# Patient Record
Sex: FEMALE | Race: WHITE | NOT HISPANIC OR LATINO | Employment: FULL TIME | URBAN - METROPOLITAN AREA
[De-identification: names, ages, dates, MRNs, and addresses within clinical notes are randomized per-mention and may not be internally consistent; named-entity substitution may affect disease eponyms.]

---

## 2020-09-14 ENCOUNTER — OFFICE VISIT (OUTPATIENT)
Dept: FAMILY MEDICINE CLINIC | Facility: CLINIC | Age: 65
End: 2020-09-14

## 2020-09-14 VITALS
HEART RATE: 80 BPM | BODY MASS INDEX: 29.23 KG/M2 | OXYGEN SATURATION: 97 % | DIASTOLIC BLOOD PRESSURE: 68 MMHG | TEMPERATURE: 97.1 F | SYSTOLIC BLOOD PRESSURE: 124 MMHG | WEIGHT: 165 LBS | HEIGHT: 63 IN

## 2020-09-14 DIAGNOSIS — G43.109 MIGRAINE WITH AURA AND WITHOUT STATUS MIGRAINOSUS, NOT INTRACTABLE: ICD-10-CM

## 2020-09-14 DIAGNOSIS — M77.8 LEFT WRIST TENDONITIS: Primary | ICD-10-CM

## 2020-09-14 DIAGNOSIS — B00.9 HERPES SIMPLEX INFECTION OF SKIN: ICD-10-CM

## 2020-09-14 PROCEDURE — 99204 OFFICE O/P NEW MOD 45 MIN: CPT | Performed by: FAMILY MEDICINE

## 2020-09-14 RX ORDER — ZOLMITRIPTAN 5 MG/1
5 TABLET, ORALLY DISINTEGRATING ORAL ONCE AS NEEDED
Qty: 9 TABLET | Refills: 0 | Status: SHIPPED | OUTPATIENT
Start: 2020-09-14

## 2020-09-14 RX ORDER — MELOXICAM 15 MG/1
15 TABLET ORAL DAILY
Qty: 30 TABLET | Refills: 0 | Status: SHIPPED | OUTPATIENT
Start: 2020-09-14 | End: 2020-11-18

## 2020-09-14 NOTE — PROGRESS NOTES
Subjective   Randi Rios is a 65 y.o. female.     Chief Complaint   Patient presents with   • Establish Care   • Wrist Pain     left       Patient is new to me today.  I had seen her in the past but the last visit was over 3 years ago.  She is here today to follow-up on migraine headaches.  She may get 3 migraines a year and takes Zomig 5 mg when she gets them.  It works well for her and she denies any side effects.    Patient is also here today with a new problem.  It started about 6 to 8 weeks ago.  She is having left forearm pain and it will radiate down into her thumb and first finger of her left hand.  Ibuprofen does help.  She has used a splint which also seems to help some.  The patient denies any swelling or redness in the area.  She also denies any trauma or injury.    Patient is also here today with another new problem.  She states that she gets a rash which is very itchy on the back of her right hand and on the left side of her low back intermittently.  She denies any pain.  It has been intermittently coming and going for the past several years.  It usually lasts about a week.  And it goes away completely in between outbreaks.       Review of Systems   Constitutional: Negative for activity change, chills, fatigue and fever.   HENT: Negative for hearing loss, swollen glands, tinnitus and trouble swallowing.    Eyes: Negative for pain and visual disturbance.   Respiratory: Negative for cough and shortness of breath.    Cardiovascular: Negative for chest pain, palpitations and leg swelling.   Gastrointestinal: Negative for diarrhea and nausea.   Endocrine: Negative for polydipsia and polyuria.   Genitourinary: Negative for difficulty urinating and urinary incontinence.   Musculoskeletal: Positive for arthralgias (L wrist pain). Negative for gait problem and joint swelling.   Skin: Positive for rash.   Allergic/Immunologic: Negative for immunocompromised state.   Neurological: Negative for dizziness,  "light-headedness and headache.   Hematological: Negative for adenopathy. Does not bruise/bleed easily.   Psychiatric/Behavioral: Negative for dysphoric mood and sleep disturbance.       The following portions of the patient's history were reviewed and updated as appropriate: allergies, current medications, past family history, past medical history, past social history, past surgical history and problem list.    Past Medical History:   Diagnosis Date   • Cancer (CMS/HCC)    • Migraine    • S/P breast biopsy, right 01/06/2016    BENIGN/ STEROTACTIC FOR CALCIFICATIONS       History reviewed. No pertinent surgical history.    Family History   Problem Relation Age of Onset   • Heart failure Mother    • Heart failure Father    • Cancer Sister    • No Known Problems Brother    • No Known Problems Son    • No Known Problems Daughter    • No Known Problems Maternal Grandmother    • No Known Problems Maternal Grandfather    • No Known Problems Paternal Grandmother    • No Known Problems Paternal Grandfather    • No Known Problems Cousin        Social History     Socioeconomic History   • Marital status:      Spouse name: Not on file   • Number of children: Not on file   • Years of education: Not on file   • Highest education level: Not on file   Tobacco Use   • Smoking status: Never Smoker   • Smokeless tobacco: Current User   Substance and Sexual Activity   • Alcohol use: Yes     Alcohol/week: 1.0 standard drinks     Types: 1 Glasses of wine per week   • Drug use: No         Current Outpatient Medications:   •  ZOLMitriptan (Zomig ZMT) 5 MG disintegrating tablet, Place 1 tablet on the tongue 1 (One) Time As Needed for Migraine for up to 1 dose., Disp: 9 tablet, Rfl: 0  •  meloxicam (MOBIC) 15 MG tablet, Take 1 tablet by mouth Daily., Disp: 30 tablet, Rfl: 0    Objective     Vitals:    09/14/20 1249   BP: 124/68   Pulse: 80   Temp: 97.1 °F (36.2 °C)   SpO2: 97%   Weight: 74.8 kg (165 lb)   Height: 158.8 cm (62.5\") "       Body mass index is 29.7 kg/m².    No components found for: 2D    Physical Exam  Vitals signs and nursing note reviewed.   Constitutional:       Appearance: She is well-developed.   HENT:      Head: Normocephalic and atraumatic.      Right Ear: Tympanic membrane, ear canal and external ear normal.      Left Ear: Tympanic membrane, ear canal and external ear normal.   Neck:      Musculoskeletal: Normal range of motion and neck supple.   Cardiovascular:      Rate and Rhythm: Normal rate and regular rhythm.      Heart sounds: Normal heart sounds.   Pulmonary:      Effort: Pulmonary effort is normal.      Breath sounds: Normal breath sounds.   Musculoskeletal: Normal range of motion.   Skin:     General: Skin is warm.      Findings: Rash (Healing papular rash located on the posterior right hand and on the left low back region.  Both areas are approximately 2 x 3 cm patches) present.   Neurological:      Mental Status: She is alert and oriented to person, place, and time.   Psychiatric:         Behavior: Behavior normal.         Thought Content: Thought content normal.         Judgment: Judgment normal.         Procedures    Assessment/Plan   Randi was seen today for establish care and wrist pain.    Diagnoses and all orders for this visit:    Left wrist tendonitis  -     meloxicam (MOBIC) 15 MG tablet; Take 1 tablet by mouth Daily.    Migraine with aura and without status migrainosus, not intractable  -     ZOLMitriptan (Zomig ZMT) 5 MG disintegrating tablet; Place 1 tablet on the tongue 1 (One) Time As Needed for Migraine for up to 1 dose.    Herpes simplex infection of skin    I advised the patient to return earlier in the process of the skin infections so that I could do a culture the next time.  However, I feel that it is likely herpes simplex infection of the skin.    I have also recommended the patient start doing daily ice applications of her left wrist and forearm.  She should also try to rest her left wrist  and wear a splint.    Patient Instructions   De Quervain's Tenosynovitis    De Quervain's tenosynovitis is a condition that causes inflammation of the tendon on the thumb side of the wrist. Tendons are cords of tissue that connect bones to muscles. The tendons in the hand pass through a tunnel called a sheath. A slippery layer of tissue (synovium) lets the tendons move smoothly in the sheath. With de Quervain's tenosynovitis, the sheath swells or thickens, causing friction and pain.  The condition is also called de Quervain's disease and de Quervain's syndrome. It occurs most often in women who are 30-50 years old.  What are the causes?  The exact cause of this condition is not known. It may be associated with overuse of the hand and wrist.  What increases the risk?  You are more likely to develop this condition if you:  · Use your hands far more than normal, especially if you repeat certain movements that involve twisting your hand or using a tight .  · Are pregnant.  · Are a middle-aged woman.  · Have rheumatoid arthritis.  · Have diabetes.  What are the signs or symptoms?  The main symptom of this condition is pain on the thumb side of the wrist. The pain may get worse when you grasp something or turn your wrist. Other symptoms may include:  · Pain that extends up the forearm.  · Swelling of your wrist and hand.  · Trouble moving the thumb and wrist.  · A sensation of snapping in the wrist.  · A bump filled with fluid (cyst) in the area of the pain.  How is this diagnosed?  This condition may be diagnosed based on:  · Your symptoms and medical history.  · A physical exam. During the exam, your health care provider may do a simple test (Finkelstein test) that involves pulling your thumb and wrist to see if this causes pain.  You may also need to have an X-ray.  How is this treated?  Treatment for this condition may include:  · Avoiding any activity that causes pain and swelling.  · Taking medicines.  Anti-inflammatory medicines and corticosteroid injections may be used to reduce inflammation and relieve pain.  · Wearing a splint.  · Having surgery. This may be needed if other treatments do not work.  Once the pain and swelling has gone down:  · Physical therapy. This includes stretching and strengthening exercises.  · Occupational therapy. This includes adjusting how you move your wrist.  Follow these instructions at home:  If you have a splint:  · Wear the splint as told by your health care provider. Remove it only as told by your health care provider.  · Loosen the splint if your fingers tingle, become numb, or turn cold and blue.  · Keep the splint clean.  · If the splint is not waterproof:  ? Do not let it get wet.  ? Cover it with a watertight covering when you take a bath or a shower.  Managing pain, stiffness, and swelling    · Avoid movements and activities that cause pain and swelling in the wrist area.  · If directed, put ice on the painful area. This may be helpful after doing activities that involve the sore wrist.  ? Put ice in a plastic bag.  ? Place a towel between your skin and the bag.  ? Leave the ice on for 20 minutes, 2-3 times a day.  · Move your fingers often to avoid stiffness and to lessen swelling.  · Raise (elevate) the injured area above the level of your heart while you are sitting or lying down.  General instructions  · Return to your normal activities as told by your health care provider. Ask your health care provider what activities are safe for you.  · Take over-the-counter and prescription medicines only as told by your health care provider.  · Keep all follow-up visits as told by your health care provider. This is important.  Contact a health care provider if:  · Your pain medicine does not help.  · Your pain gets worse.  · You develop new symptoms.  Summary  · De Quervain's tenosynovitis is a condition that causes inflammation of the tendon on the thumb side of the  wrist.  · The condition occurs most often in women who are 30-50 years old.  · The exact cause of this condition is not known. It may be associated with overuse of the hand and wrist.  · Treatment starts with avoiding activity that causes pain or swelling in the wrist area. Other treatment may include wearing a splint and taking medicine. Sometimes, surgery is needed.  This information is not intended to replace advice given to you by your health care provider. Make sure you discuss any questions you have with your health care provider.  Document Released: 09/12/2002 Document Revised: 06/20/2019 Document Reviewed: 11/26/2018  Elsevier Patient Education © 2020 [employer]

## 2020-09-14 NOTE — PATIENT INSTRUCTIONS
De Quervain's Tenosynovitis    De Quervain's tenosynovitis is a condition that causes inflammation of the tendon on the thumb side of the wrist. Tendons are cords of tissue that connect bones to muscles. The tendons in the hand pass through a tunnel called a sheath. A slippery layer of tissue (synovium) lets the tendons move smoothly in the sheath. With de Quervain's tenosynovitis, the sheath swells or thickens, causing friction and pain.  The condition is also called de Quervain's disease and de Quervain's syndrome. It occurs most often in women who are 30-50 years old.  What are the causes?  The exact cause of this condition is not known. It may be associated with overuse of the hand and wrist.  What increases the risk?  You are more likely to develop this condition if you:  · Use your hands far more than normal, especially if you repeat certain movements that involve twisting your hand or using a tight .  · Are pregnant.  · Are a middle-aged woman.  · Have rheumatoid arthritis.  · Have diabetes.  What are the signs or symptoms?  The main symptom of this condition is pain on the thumb side of the wrist. The pain may get worse when you grasp something or turn your wrist. Other symptoms may include:  · Pain that extends up the forearm.  · Swelling of your wrist and hand.  · Trouble moving the thumb and wrist.  · A sensation of snapping in the wrist.  · A bump filled with fluid (cyst) in the area of the pain.  How is this diagnosed?  This condition may be diagnosed based on:  · Your symptoms and medical history.  · A physical exam. During the exam, your health care provider may do a simple test (Finkelstein test) that involves pulling your thumb and wrist to see if this causes pain.  You may also need to have an X-ray.  How is this treated?  Treatment for this condition may include:  · Avoiding any activity that causes pain and swelling.  · Taking medicines. Anti-inflammatory medicines and corticosteroid  injections may be used to reduce inflammation and relieve pain.  · Wearing a splint.  · Having surgery. This may be needed if other treatments do not work.  Once the pain and swelling has gone down:  · Physical therapy. This includes stretching and strengthening exercises.  · Occupational therapy. This includes adjusting how you move your wrist.  Follow these instructions at home:  If you have a splint:  · Wear the splint as told by your health care provider. Remove it only as told by your health care provider.  · Loosen the splint if your fingers tingle, become numb, or turn cold and blue.  · Keep the splint clean.  · If the splint is not waterproof:  ? Do not let it get wet.  ? Cover it with a watertight covering when you take a bath or a shower.  Managing pain, stiffness, and swelling    · Avoid movements and activities that cause pain and swelling in the wrist area.  · If directed, put ice on the painful area. This may be helpful after doing activities that involve the sore wrist.  ? Put ice in a plastic bag.  ? Place a towel between your skin and the bag.  ? Leave the ice on for 20 minutes, 2-3 times a day.  · Move your fingers often to avoid stiffness and to lessen swelling.  · Raise (elevate) the injured area above the level of your heart while you are sitting or lying down.  General instructions  · Return to your normal activities as told by your health care provider. Ask your health care provider what activities are safe for you.  · Take over-the-counter and prescription medicines only as told by your health care provider.  · Keep all follow-up visits as told by your health care provider. This is important.  Contact a health care provider if:  · Your pain medicine does not help.  · Your pain gets worse.  · You develop new symptoms.  Summary  · De Quervain's tenosynovitis is a condition that causes inflammation of the tendon on the thumb side of the wrist.  · The condition occurs most often in women who are  30-50 years old.  · The exact cause of this condition is not known. It may be associated with overuse of the hand and wrist.  · Treatment starts with avoiding activity that causes pain or swelling in the wrist area. Other treatment may include wearing a splint and taking medicine. Sometimes, surgery is needed.  This information is not intended to replace advice given to you by your health care provider. Make sure you discuss any questions you have with your health care provider.  Document Released: 09/12/2002 Document Revised: 06/20/2019 Document Reviewed: 11/26/2018  Elsevier Patient Education © 2020 [employer]

## 2020-10-08 ENCOUNTER — OFFICE VISIT (OUTPATIENT)
Dept: FAMILY MEDICINE CLINIC | Facility: CLINIC | Age: 65
End: 2020-10-08

## 2020-10-08 VITALS
WEIGHT: 165 LBS | TEMPERATURE: 93.7 F | HEART RATE: 85 BPM | SYSTOLIC BLOOD PRESSURE: 126 MMHG | OXYGEN SATURATION: 98 % | BODY MASS INDEX: 29.23 KG/M2 | DIASTOLIC BLOOD PRESSURE: 72 MMHG | HEIGHT: 63 IN

## 2020-10-08 DIAGNOSIS — B00.89 HERPES DERMATITIS: Primary | ICD-10-CM

## 2020-10-08 PROCEDURE — 99214 OFFICE O/P EST MOD 30 MIN: CPT | Performed by: FAMILY MEDICINE

## 2020-10-08 RX ORDER — VALACYCLOVIR HYDROCHLORIDE 1 G/1
1000 TABLET, FILM COATED ORAL 3 TIMES DAILY
Qty: 21 TABLET | Refills: 0 | Status: SHIPPED | OUTPATIENT
Start: 2020-10-08

## 2020-10-08 NOTE — PROGRESS NOTES
Subjective   Randi Rios is a 65 y.o. female.     Chief Complaint   Patient presents with   • Rash     to right hand       Patient is here today with a new problem to the examiner.  It started this morning with itching and a rash on her right hand and on the left side of her lower back.  She states that she has had these symptoms in the past and it usually breaks out in a rash that is very itchy for a few days then goes away on it's own.  She has never used any medication on it except for topical lotions.  She has never had any cultures done of the lesions when the rash breaks out.       Review of Systems   Constitutional: Negative for activity change, chills, fatigue and fever.   HENT: Negative for hearing loss, swollen glands, tinnitus and trouble swallowing.    Eyes: Negative for pain and visual disturbance.   Respiratory: Negative for cough and shortness of breath.    Cardiovascular: Negative for chest pain, palpitations and leg swelling.   Gastrointestinal: Negative for diarrhea and nausea.   Endocrine: Negative for polydipsia and polyuria.   Genitourinary: Negative for difficulty urinating and urinary incontinence.   Musculoskeletal: Negative for arthralgias, gait problem and joint swelling.   Skin: Positive for rash.   Allergic/Immunologic: Negative for immunocompromised state.   Neurological: Negative for dizziness, light-headedness and headache.   Hematological: Negative for adenopathy. Does not bruise/bleed easily.   Psychiatric/Behavioral: Negative for dysphoric mood and sleep disturbance.       The following portions of the patient's history were reviewed and updated as appropriate: allergies, current medications, past family history, past medical history, past social history, past surgical history and problem list.    Past Medical History:   Diagnosis Date   • Cancer (CMS/HCC)    • Migraine    • S/P breast biopsy, right 01/06/2016    BENIGN/ STEROTACTIC FOR CALCIFICATIONS       History reviewed. No  "pertinent surgical history.    Family History   Problem Relation Age of Onset   • Heart failure Mother    • Heart failure Father    • Cancer Sister    • No Known Problems Brother    • No Known Problems Son    • No Known Problems Daughter    • No Known Problems Maternal Grandmother    • No Known Problems Maternal Grandfather    • No Known Problems Paternal Grandmother    • No Known Problems Paternal Grandfather    • No Known Problems Cousin        Social History     Socioeconomic History   • Marital status:      Spouse name: Not on file   • Number of children: Not on file   • Years of education: Not on file   • Highest education level: Not on file   Tobacco Use   • Smoking status: Never Smoker   • Smokeless tobacco: Current User   Substance and Sexual Activity   • Alcohol use: Yes     Alcohol/week: 1.0 standard drinks     Types: 1 Glasses of wine per week   • Drug use: No         Current Outpatient Medications:   •  meloxicam (MOBIC) 15 MG tablet, Take 1 tablet by mouth Daily., Disp: 30 tablet, Rfl: 0  •  ZOLMitriptan (Zomig ZMT) 5 MG disintegrating tablet, Place 1 tablet on the tongue 1 (One) Time As Needed for Migraine for up to 1 dose., Disp: 9 tablet, Rfl: 0  •  valACYclovir (Valtrex) 1000 MG tablet, Take 1 tablet by mouth 3 (Three) Times a Day., Disp: 21 tablet, Rfl: 0    Objective     Vitals:    10/08/20 1457   BP: 126/72   Pulse: 85   Temp: 93.7 °F (34.3 °C)   SpO2: 98%   Weight: 74.8 kg (165 lb)   Height: 158.8 cm (62.5\")       Body mass index is 29.7 kg/m².    No components found for: 2D    Physical Exam  Vitals signs and nursing note reviewed.   Constitutional:       Appearance: Normal appearance. She is well-developed.   HENT:      Head: Normocephalic and atraumatic.   Eyes:      General: No scleral icterus.     Conjunctiva/sclera: Conjunctivae normal.   Neck:      Musculoskeletal: Normal range of motion and neck supple.   Pulmonary:      Effort: Pulmonary effort is normal.   Lymphadenopathy:      " Cervical: No cervical adenopathy.   Skin:     General: Skin is warm and dry.      Findings: Rash present.      Comments: There is a erythematous papular patch located on the posterior right hand near the thumb and also an erythematous papular patch located on the left side of the lower back.  Both patches are about 3 x 5 cm in size   Neurological:      General: No focal deficit present.      Mental Status: She is alert and oriented to person, place, and time.   Psychiatric:         Mood and Affect: Mood normal.         Behavior: Behavior normal.         Thought Content: Thought content normal.         Judgment: Judgment normal.         Procedures    Assessment/Plan   Randi was seen today for rash.    Diagnoses and all orders for this visit:    Herpes dermatitis  -     Herpes Simplex Virus Culture - Swab, Axilla  -     Varicella Zoster Culture - Scrapings, Hand, Right  -     valACYclovir (Valtrex) 1000 MG tablet; Take 1 tablet by mouth 3 (Three) Times a Day.    The patient's rash is not vesicular at this time, it is papular.  That may affect the results of her cultures that I attempted to obtain today.  In any event, the rash does look like herpes simplex or zoster and should be effectively treated with Valtrex.  Patient was advised to return for any worsening symptoms.    There are no Patient Instructions on file for this visit.

## 2020-10-10 LAB — HSV SPEC CULT: NEGATIVE

## 2020-10-17 LAB — VZV SPEC QL CULT: NORMAL

## 2020-11-18 ENCOUNTER — OFFICE VISIT (OUTPATIENT)
Dept: FAMILY MEDICINE CLINIC | Facility: CLINIC | Age: 65
End: 2020-11-18

## 2020-11-18 VITALS
DIASTOLIC BLOOD PRESSURE: 86 MMHG | SYSTOLIC BLOOD PRESSURE: 132 MMHG | WEIGHT: 161.6 LBS | BODY MASS INDEX: 28.63 KG/M2 | HEART RATE: 78 BPM | HEIGHT: 63 IN | TEMPERATURE: 97.3 F | OXYGEN SATURATION: 98 %

## 2020-11-18 DIAGNOSIS — Z13.1 ENCOUNTER FOR SCREENING FOR DIABETES MELLITUS: ICD-10-CM

## 2020-11-18 DIAGNOSIS — M77.8 LEFT WRIST TENDONITIS: ICD-10-CM

## 2020-11-18 DIAGNOSIS — M25.532 LEFT WRIST PAIN: ICD-10-CM

## 2020-11-18 DIAGNOSIS — Z00.00 MEDICARE ANNUAL WELLNESS VISIT, SUBSEQUENT: Primary | ICD-10-CM

## 2020-11-18 DIAGNOSIS — Z11.59 NEED FOR HEPATITIS C SCREENING TEST: ICD-10-CM

## 2020-11-18 DIAGNOSIS — Z23 NEED FOR VACCINATION AGAINST STREPTOCOCCUS PNEUMONIAE USING PNEUMOCOCCAL CONJUGATE VACCINE 13: ICD-10-CM

## 2020-11-18 PROCEDURE — G0439 PPPS, SUBSEQ VISIT: HCPCS | Performed by: FAMILY MEDICINE

## 2020-11-18 PROCEDURE — 99213 OFFICE O/P EST LOW 20 MIN: CPT | Performed by: FAMILY MEDICINE

## 2020-11-18 PROCEDURE — 90670 PCV13 VACCINE IM: CPT | Performed by: FAMILY MEDICINE

## 2020-11-18 PROCEDURE — G0009 ADMIN PNEUMOCOCCAL VACCINE: HCPCS | Performed by: FAMILY MEDICINE

## 2020-11-18 NOTE — PROGRESS NOTES
The ABCs of the Annual Wellness Visit  Subsequent Medicare Wellness Visit    Chief Complaint   Patient presents with   • Medicare Wellness-subsequent     Patient is also here today to follow-up on left wrist tendinopathy.  I advised her to start meloxicam 15 mg daily, routinely using ice applications throughout the day and she has used a splint which also seems to help some.  However, all of these conservative treatments have not seemed to help overall with her pain.    Subjective   History of Present Illness:  Randi Rios is a 65 y.o. female who presents for a Subsequent Medicare Wellness Visit.    HEALTH RISK ASSESSMENT    Recent Hospitalizations:  No hospitalization(s) within the last year.    Current Medical Providers:  Patient Care Team:  Breana Sahni DO as PCP - General    Smoking Status:  Social History     Tobacco Use   Smoking Status Never Smoker   Smokeless Tobacco Current User       Alcohol Consumption:  Social History     Substance and Sexual Activity   Alcohol Use Yes   • Alcohol/week: 1.0 standard drinks   • Types: 1 Glasses of wine per week       Depression Screen:   PHQ-2/PHQ-9 Depression Screening 11/18/2020   Little interest or pleasure in doing things 0   Feeling down, depressed, or hopeless 0   Trouble falling or staying asleep, or sleeping too much 0   Feeling tired or having little energy 1   Poor appetite or overeating 0   Feeling bad about yourself - or that you are a failure or have let yourself or your family down 0   Trouble concentrating on things, such as reading the newspaper or watching television 0   Moving or speaking so slowly that other people could have noticed. Or the opposite - being so fidgety or restless that you have been moving around a lot more than usual 0   Thoughts that you would be better off dead, or of hurting yourself in some way 0   Total Score 1   If you checked off any problems, how difficult have these problems made it for you to do your work, take care  of things at home, or get along with other people? Not difficult at all       Fall Risk Screen:  SNOW Fall Risk Assessment was completed, and patient is at LOW risk for falls.Assessment completed on:11/18/2020    Health Habits and Functional and Cognitive Screening:  Functional & Cognitive Status 11/18/2020   Do you have difficulty preparing food and eating? No   Do you have difficulty bathing yourself, getting dressed or grooming yourself? No   Do you have difficulty using the toilet? No   Do you have difficulty moving around from place to place? No   Do you have trouble with steps or getting out of a bed or a chair? No   Current Diet Well Balanced Diet   Dental Exam Up to date   Eye Exam Up to date   Exercise (times per week) 5 times per week   Current Exercises Include Pilates   Do you need help using the phone?  No   Are you deaf or do you have serious difficulty hearing?  Yes   Do you need help with transportation? No   Do you need help shopping? No   Do you need help preparing meals?  No   Do you need help with housework?  No   Do you need help with laundry? No   Do you need help taking your medications? No   Do you need help managing money? No   Do you ever drive or ride in a car without wearing a seat belt? No   Have you felt unusual stress, anger or loneliness in the last month? Yes   Who do you live with? Spouse   If you need help, do you have trouble finding someone available to you? No   Have you been bothered in the last four weeks by sexual problems? No   Do you have difficulty concentrating, remembering or making decisions? No         Does the patient have evidence of cognitive impairment? No    Asprin use counseling:Does not need ASA (and currently is not on it)    Age-appropriate Screening Schedule:  Refer to the list below for future screening recommendations based on patient's age, sex and/or medical conditions. Orders for these recommended tests are listed in the plan section. The patient has  been provided with a written plan.    Health Maintenance   Topic Date Due   • ZOSTER VACCINE (1 of 2) 09/01/2004   • COLONOSCOPY  11/01/2021 (Originally 1/1/2020)   • MAMMOGRAM  05/01/2022   • TDAP/TD VACCINES (2 - Td) 05/14/2029   • INFLUENZA VACCINE  Completed          The following portions of the patient's history were reviewed and updated as appropriate: allergies, current medications, past family history, past medical history, past social history, past surgical history and problem list.    Outpatient Medications Prior to Visit   Medication Sig Dispense Refill   • ZOLMitriptan (Zomig ZMT) 5 MG disintegrating tablet Place 1 tablet on the tongue 1 (One) Time As Needed for Migraine for up to 1 dose. 9 tablet 0   • valACYclovir (Valtrex) 1000 MG tablet Take 1 tablet by mouth 3 (Three) Times a Day. 21 tablet 0   • meloxicam (MOBIC) 15 MG tablet Take 1 tablet by mouth Daily. 30 tablet 0     No facility-administered medications prior to visit.        There is no problem list on file for this patient.      Advanced Care Planning:  ACP discussion was held with the patient during this visit. Patient has an advance directive (not in EMR), copy requested.    Review of Systems   Constitutional: Negative for activity change, appetite change, fatigue and unexpected weight change.   HENT: Negative for congestion, ear pain, nosebleeds, sore throat and tinnitus.    Eyes: Negative for pain, redness and visual disturbance.   Respiratory: Negative for cough, shortness of breath and wheezing.    Cardiovascular: Negative for chest pain, palpitations and leg swelling.   Gastrointestinal: Negative for abdominal pain, blood in stool and nausea.   Endocrine: Negative for polydipsia and polyuria.   Genitourinary: Negative for dysuria, frequency, menstrual problem and vaginal discharge.   Musculoskeletal: Negative for arthralgias, joint swelling and myalgias.   Skin: Negative for rash.   Allergic/Immunologic: Negative for environmental  "allergies, food allergies and immunocompromised state.   Neurological: Negative for dizziness, speech difficulty, weakness and headaches.   Hematological: Negative for adenopathy. Does not bruise/bleed easily.   Psychiatric/Behavioral: Negative for decreased concentration and dysphoric mood. The patient is not nervous/anxious.        Compared to one year ago, the patient feels her physical health is the same.  Compared to one year ago, the patient feels her mental health is worse.    Reviewed chart for potential of high risk medication in the elderly: yes  Reviewed chart for potential of harmful drug interactions in the elderly:yes    Objective         Vitals:    11/18/20 1052   BP: 132/86   Pulse: 78   Temp: 97.3 °F (36.3 °C)   SpO2: 98%   Weight: 73.3 kg (161 lb 9.6 oz)   Height: 158.8 cm (62.5\")       Body mass index is 29.09 kg/m².  Discussed the patient's BMI with her. The BMI is above average; BMI management plan is completed.    Physical Exam  Vitals signs and nursing note reviewed.   Constitutional:       Appearance: She is well-developed.   HENT:      Head: Normocephalic and atraumatic.      Right Ear: External ear normal.      Left Ear: External ear normal.      Nose: Nose normal.   Eyes:      General: No scleral icterus.     Conjunctiva/sclera: Conjunctivae normal.   Neck:      Musculoskeletal: Normal range of motion and neck supple.   Cardiovascular:      Rate and Rhythm: Normal rate and regular rhythm.      Heart sounds: Normal heart sounds.   Pulmonary:      Effort: Pulmonary effort is normal.      Breath sounds: Normal breath sounds.   Musculoskeletal: Normal range of motion.         General: No swelling or deformity.      Right lower leg: No edema.      Left lower leg: No edema.      Comments: Normal and equal bilateral  strength.  Pain with range of motion of the left wrist.   Lymphadenopathy:      Cervical: No cervical adenopathy.   Skin:     General: Skin is warm and dry.      Findings: No " rash.   Neurological:      General: No focal deficit present.      Mental Status: She is alert and oriented to person, place, and time.   Psychiatric:         Mood and Affect: Mood normal.         Behavior: Behavior normal.         Thought Content: Thought content normal.         Judgment: Judgment normal.             Assessment/Plan   Medicare Risks and Personalized Health Plan  CMS Preventative Services Quick Reference  Advance Directive Discussion  Breast Cancer/Mammogram Screening  Cardiovascular risk  Colon Cancer Screening  Dementia/Memory   Depression/Dysphoria  Diabetic Lab Screening   Fall Risk  Glaucoma Risk  Immunizations Discussed/Encouraged (specific immunizations; Hepatitis A Vaccine/Series, Hepatitis B Vaccine/Series, Influenza, Pneumococcal 23, Prevnar and Shingrix )  Obesity/Overweight   Osteoprorosis Risk    The above risks/problems have been discussed with the patient.  Pertinent information has been shared with the patient in the After Visit Summary.  Follow up plans and orders are seen below in the Assessment/Plan Section.    Diagnoses and all orders for this visit:    1. Medicare annual wellness visit, subsequent (Primary)    2. Need for hepatitis C screening test  -     Hepatitis C Antibody    3. Need for vaccination against Streptococcus pneumoniae using pneumococcal conjugate vaccine 13  -     Pneumococcal Conjugate Vaccine 13-Val[facility] All    4. Encounter for screening for diabetes mellitus  -     Basic Metabolic Panel    5. Left wrist tendonitis  -     Ambulatory Referral to Orthopedic Surgery    6. Left wrist pain  -     Ambulatory Referral to Orthopedic Surgery      Follow Up:  No follow-ups on file.     An After Visit Summary and PPPS were given to the patient.

## 2020-11-19 LAB
BUN SERPL-MCNC: 20 MG/DL (ref 8–27)
BUN/CREAT SERPL: 27 (ref 12–28)
CALCIUM SERPL-MCNC: 9.4 MG/DL (ref 8.7–10.3)
CHLORIDE SERPL-SCNC: 103 MMOL/L (ref 96–106)
CO2 SERPL-SCNC: 26 MMOL/L (ref 20–29)
CREAT SERPL-MCNC: 0.74 MG/DL (ref 0.57–1)
GLUCOSE SERPL-MCNC: 97 MG/DL (ref 65–99)
HCV AB S/CO SERPL IA: <0.1 S/CO RATIO (ref 0–0.9)
POTASSIUM SERPL-SCNC: 4.4 MMOL/L (ref 3.5–5.2)
SODIUM SERPL-SCNC: 145 MMOL/L (ref 134–144)

## 2021-03-22 ENCOUNTER — BULK ORDERING (OUTPATIENT)
Dept: CASE MANAGEMENT | Facility: OTHER | Age: 66
End: 2021-03-22

## 2021-03-22 DIAGNOSIS — Z23 IMMUNIZATION DUE: ICD-10-CM

## 2021-06-11 ENCOUNTER — TELEPHONE (OUTPATIENT)
Dept: FAMILY MEDICINE CLINIC | Facility: CLINIC | Age: 66
End: 2021-06-11

## 2021-06-11 NOTE — TELEPHONE ENCOUNTER
Caller: Randi Rios    Relationship: Self    Best call back number:[phone]    What orders are you requesting (i.e. lab or imaging): DEXA SCAN     In what timeframe would the patient need to come in: ASAP     Where will you receive your lab/imaging services:     Additional notes: PATIENT CALLED IN AND WAS RECOMMENDED BY HER OBGYN TO HAVE A DEXA SCAN AND TO CONTACT HER PRIMARY DOCTOR TO ORDER IT. PLEASE CALL PATIENT AND ADVISE.

## 2021-11-22 ENCOUNTER — OFFICE VISIT (OUTPATIENT)
Dept: FAMILY MEDICINE CLINIC | Facility: CLINIC | Age: 66
End: 2021-11-22

## 2021-11-22 VITALS
HEART RATE: 71 BPM | OXYGEN SATURATION: 99 % | SYSTOLIC BLOOD PRESSURE: 136 MMHG | TEMPERATURE: 97.3 F | WEIGHT: 168.8 LBS | DIASTOLIC BLOOD PRESSURE: 72 MMHG | HEIGHT: 63 IN | BODY MASS INDEX: 29.91 KG/M2

## 2021-11-22 DIAGNOSIS — Z13.1 SCREENING FOR DIABETES MELLITUS: ICD-10-CM

## 2021-11-22 DIAGNOSIS — Z13.820 ENCOUNTER FOR SCREENING FOR OSTEOPOROSIS: ICD-10-CM

## 2021-11-22 DIAGNOSIS — Z78.0 MENOPAUSE: ICD-10-CM

## 2021-11-22 DIAGNOSIS — Z13.220 ENCOUNTER FOR LIPID SCREENING FOR CARDIOVASCULAR DISEASE: ICD-10-CM

## 2021-11-22 DIAGNOSIS — Z13.6 ENCOUNTER FOR LIPID SCREENING FOR CARDIOVASCULAR DISEASE: ICD-10-CM

## 2021-11-22 DIAGNOSIS — Z86.010 HISTORY OF COLONIC POLYPS: ICD-10-CM

## 2021-11-22 DIAGNOSIS — Z00.00 MEDICARE ANNUAL WELLNESS VISIT, SUBSEQUENT: Primary | ICD-10-CM

## 2021-11-22 DIAGNOSIS — Z12.11 SCREENING FOR MALIGNANT NEOPLASM OF COLON: ICD-10-CM

## 2021-11-22 PROCEDURE — 1159F MED LIST DOCD IN RCRD: CPT | Performed by: FAMILY MEDICINE

## 2021-11-22 PROCEDURE — 1170F FXNL STATUS ASSESSED: CPT | Performed by: FAMILY MEDICINE

## 2021-11-22 PROCEDURE — G0439 PPPS, SUBSEQ VISIT: HCPCS | Performed by: FAMILY MEDICINE

## 2021-11-22 NOTE — PROGRESS NOTES
The ABCs of the Annual Wellness Visit  Subsequent Medicare Wellness Visit    Chief Complaint   Patient presents with   • Medicare Wellness-subsequent      Subjective    History of Present Illness:  Randi Rios is a 66 y.o. female who presents for a Subsequent Medicare Wellness Visit.    The following portions of the patient's history were reviewed and   updated as appropriate: allergies, current medications, past family history, past social history, past surgical history and problem list.    Compared to one year ago, the patient feels her physical   health is the same.    Compared to one year ago, the patient feels her mental   health is the same.    Recent Hospitalizations:  She was not admitted to the hospital during the last year.       Current Medical Providers:  Patient Care Team:  Breana Sahni,  as PCP - General    Outpatient Medications Prior to Visit   Medication Sig Dispense Refill   • ZOLMitriptan (Zomig ZMT) 5 MG disintegrating tablet Place 1 tablet on the tongue 1 (One) Time As Needed for Migraine for up to 1 dose. 9 tablet 0   • valACYclovir (Valtrex) 1000 MG tablet Take 1 tablet by mouth 3 (Three) Times a Day. 21 tablet 0     No facility-administered medications prior to visit.       No opioid medication identified on active medication list. I have reviewed chart for other potential  high risk medication/s and harmful drug interactions in the elderly.          Aspirin is not on active medication list.  Aspirin use is not indicated based on review of current medical condition/s. Risk of harm outweighs potential benefits.  .    There is no problem list on file for this patient.    Advance Care Planning  Advance Directive is not on file.  ACP discussion was held with the patient during this visit. Patient has an advance directive (not in EMR), copy requested.          Objective    Vitals:    11/22/21 0912   BP: 136/72   Pulse: 71   Temp: 97.3 °F (36.3 °C)   SpO2: 99%   Weight: 76.6 kg (168 lb  "12.8 oz)   Height: 160 cm (63\")     Body mass index is 29.9 kg/m².  BMI has not been calculated during today's encounter.     Does the patient have evidence of cognitive impairment? No    Physical Exam  Vitals and nursing note reviewed.   Constitutional:       Appearance: Normal appearance. She is well-developed.   HENT:      Head: Normocephalic and atraumatic.   Eyes:      General: No scleral icterus.     Conjunctiva/sclera: Conjunctivae normal.   Cardiovascular:      Rate and Rhythm: Normal rate and regular rhythm.      Heart sounds: Normal heart sounds.   Pulmonary:      Effort: Pulmonary effort is normal.      Breath sounds: Normal breath sounds.   Abdominal:      General: Bowel sounds are normal.      Palpations: Abdomen is soft.   Musculoskeletal:         General: Normal range of motion.      Cervical back: Normal range of motion and neck supple.      Right lower leg: No edema.      Left lower leg: No edema.   Skin:     General: Skin is warm and dry.      Capillary Refill: Capillary refill takes less than 2 seconds.      Findings: No rash.   Neurological:      Mental Status: She is alert and oriented to person, place, and time.   Psychiatric:         Mood and Affect: Mood normal.         Behavior: Behavior normal.         Thought Content: Thought content normal.         Judgment: Judgment normal.                 HEALTH RISK ASSESSMENT    Smoking Status:  Social History     Tobacco Use   Smoking Status Never Smoker   Smokeless Tobacco Current User     Alcohol Consumption:  Social History     Substance and Sexual Activity   Alcohol Use Yes   • Alcohol/week: 1.0 standard drink   • Types: 1 Glasses of wine per week     Fall Risk Screen:    SNOW Fall Risk Assessment was completed, and patient is at LOW risk for falls.Assessment completed on:11/22/2021    Depression Screening:  PHQ-2/PHQ-9 Depression Screening 11/22/2021   Little interest or pleasure in doing things 0   Feeling down, depressed, or hopeless 0 "   Trouble falling or staying asleep, or sleeping too much -   Feeling tired or having little energy -   Poor appetite or overeating -   Feeling bad about yourself - or that you are a failure or have let yourself or your family down -   Trouble concentrating on things, such as reading the newspaper or watching television -   Moving or speaking so slowly that other people could have noticed. Or the opposite - being so fidgety or restless that you have been moving around a lot more than usual -   Thoughts that you would be better off dead, or of hurting yourself in some way -   Total Score 0   If you checked off any problems, how difficult have these problems made it for you to do your work, take care of things at home, or get along with other people? -       Health Habits and Functional and Cognitive Screening:  Functional & Cognitive Status 11/22/2021   Do you have difficulty preparing food and eating? No   Do you have difficulty bathing yourself, getting dressed or grooming yourself? No   Do you have difficulty using the toilet? No   Do you have difficulty moving around from place to place? No   Do you have trouble with steps or getting out of a bed or a chair? No   Current Diet Well Balanced Diet   Dental Exam Up to date   Eye Exam Up to date   Exercise (times per week) 2 times per week   Current Exercises Include Walking   Do you need help using the phone?  No   Are you deaf or do you have serious difficulty hearing?  Yes   Do you need help with transportation? No   Do you need help shopping? No   Do you need help preparing meals?  No   Do you need help with housework?  No   Do you need help with laundry? No   Do you need help taking your medications? No   Do you need help managing money? No   Do you ever drive or ride in a car without wearing a seat belt? No   Have you felt unusual stress, anger or loneliness in the last month? -   Who do you live with? -   If you need help, do you have trouble finding someone  available to you? -   Have you been bothered in the last four weeks by sexual problems? -   Do you have difficulty concentrating, remembering or making decisions? -       Age-appropriate Screening Schedule:  Refer to the list below for future screening recommendations based on patient's age, sex and/or medical conditions. Orders for these recommended tests are listed in the plan section. The patient has been provided with a written plan.    Health Maintenance   Topic Date Due   • DXA SCAN  Never done   • ZOSTER VACCINE (2 of 2) 12/31/2021   • MAMMOGRAM  05/03/2023   • TDAP/TD VACCINES (2 - Td or Tdap) 05/14/2029   • INFLUENZA VACCINE  Completed              Assessment/Plan   CMS Preventative Services Quick Reference  Risk Factors Identified During Encounter  Immunizations Discussed/Encouraged (specific Immunizations; Influenza, Pneumococcal 23, Shingrix and COVID19  Obesity/Overweight   The above risks/problems have been discussed with the patient.  Follow up actions/plans if indicated are seen below in the Assessment/Plan Section.  Pertinent information has been shared with the patient in the After Visit Summary.    Diagnoses and all orders for this visit:    1. Medicare annual wellness visit, subsequent (Primary)    2. Encounter for screening for osteoporosis  -     DEXA Bone Density Axial; Future    3. Menopause  -     DEXA Bone Density Axial; Future    4. History of colonic polyps  -     Ambulatory Referral to General Surgery    5. Screening for malignant neoplasm of colon  -     Ambulatory Referral to General Surgery    6. Encounter for lipid screening for cardiovascular disease  -     Lipid Panel    7. Screening for diabetes mellitus  -     Comprehensive Metabolic Panel        Follow Up:   Return in about 1 year (around 11/22/2022) for Medicare Wellness.     An After Visit Summary and PPPS were made available to the patient.                    staff

## 2021-11-23 LAB
ALBUMIN SERPL-MCNC: 4 G/DL (ref 3.8–4.8)
ALBUMIN/GLOB SERPL: 1.5 {RATIO} (ref 1.2–2.2)
ALP SERPL-CCNC: 68 IU/L (ref 44–121)
ALT SERPL-CCNC: 14 IU/L (ref 0–32)
AST SERPL-CCNC: 18 IU/L (ref 0–40)
BILIRUB SERPL-MCNC: 0.4 MG/DL (ref 0–1.2)
BUN SERPL-MCNC: 18 MG/DL (ref 8–27)
BUN/CREAT SERPL: 24 (ref 12–28)
CALCIUM SERPL-MCNC: 9.4 MG/DL (ref 8.7–10.3)
CHLORIDE SERPL-SCNC: 104 MMOL/L (ref 96–106)
CHOLEST SERPL-MCNC: 206 MG/DL (ref 100–199)
CO2 SERPL-SCNC: 26 MMOL/L (ref 20–29)
CREAT SERPL-MCNC: 0.74 MG/DL (ref 0.57–1)
GLOBULIN SER CALC-MCNC: 2.6 G/DL (ref 1.5–4.5)
GLUCOSE SERPL-MCNC: 93 MG/DL (ref 65–99)
HDLC SERPL-MCNC: 72 MG/DL
LDLC SERPL CALC-MCNC: 123 MG/DL (ref 0–99)
POTASSIUM SERPL-SCNC: 4.4 MMOL/L (ref 3.5–5.2)
PROT SERPL-MCNC: 6.6 G/DL (ref 6–8.5)
SODIUM SERPL-SCNC: 142 MMOL/L (ref 134–144)
TRIGL SERPL-MCNC: 59 MG/DL (ref 0–149)
VLDLC SERPL CALC-MCNC: 11 MG/DL (ref 5–40)

## 2021-12-08 ENCOUNTER — TELEPHONE (OUTPATIENT)
Dept: FAMILY MEDICINE CLINIC | Facility: CLINIC | Age: 66
End: 2021-12-08

## 2021-12-08 NOTE — TELEPHONE ENCOUNTER
Caller: Randi Rios    Relationship to patient: Self    Best call back number: [phone]    Patient is needing: PATIENT CANCELLED CONSULTATION APPT WITH SURGEON THAT DR MICHAELS RECOMMENDED. PATIENT IS ASKING IS DR MICHAELS HAS RECORD OF WHEN SHE HAD LAST COLONOSCOPY AND WHO THE DOCTOR WAS.     PLEASE ADVISE

## 2021-12-08 NOTE — TELEPHONE ENCOUNTER
Pt advised that we do not have record of a colonoscopy on file.  We do not have her records from the old office

## 2021-12-09 ENCOUNTER — TELEPHONE (OUTPATIENT)
Dept: FAMILY MEDICINE CLINIC | Facility: CLINIC | Age: 66
End: 2021-12-09

## 2021-12-09 DIAGNOSIS — Z86.010 HISTORY OF COLONIC POLYPS: Primary | ICD-10-CM

## 2021-12-09 DIAGNOSIS — Z12.11 SCREENING FOR MALIGNANT NEOPLASM OF COLON: ICD-10-CM

## 2021-12-09 NOTE — TELEPHONE ENCOUNTER
PATIENT IS REQUESTING A NEW COLONOSCOPY REFERRAL WITH A DIFFERENT PROVIDER. SHE SAID SHE DOES NOT WANT TO SEE DR CAESAR ALMANZAR.     CALL: [phone]

## 2021-12-09 NOTE — TELEPHONE ENCOUNTER
Will you please check with the patient to see where she would like to have her colonoscopy done?  That will determine whom I refer her to.  FRANCIA- Dr. Green is the only provider performing colonoscopies at [facility].

## 2021-12-27 ENCOUNTER — PRE-PROCEDURE SCREENING (OUTPATIENT)
Dept: GASTROENTEROLOGY | Facility: CLINIC | Age: 66
End: 2021-12-27

## 2021-12-27 NOTE — TELEPHONE ENCOUNTER
Colonoscopy screening has been sent to  for review           Pt verbalized and understood that it would be few weeks before been schedule             Records scan in media

## 2022-01-09 ENCOUNTER — PREP FOR SURGERY (OUTPATIENT)
Dept: OTHER | Facility: HOSPITAL | Age: 67
End: 2022-01-09

## 2022-01-09 DIAGNOSIS — K63.5 COLON POLYP: Primary | ICD-10-CM

## 2022-02-18 ENCOUNTER — TELEPHONE (OUTPATIENT)
Dept: GASTROENTEROLOGY | Facility: CLINIC | Age: 67
End: 2022-02-18

## 2022-04-05 ENCOUNTER — TELEPHONE (OUTPATIENT)
Dept: GASTROENTEROLOGY | Facility: CLINIC | Age: 67
End: 2022-04-05

## 2022-04-06 PROBLEM — K63.5 COLON POLYP: Status: ACTIVE | Noted: 2022-04-06

## 2022-04-15 ENCOUNTER — HOSPITAL ENCOUNTER (OUTPATIENT)
Dept: BONE DENSITY | Facility: HOSPITAL | Age: 67
Discharge: HOME | End: 2022-04-15
Admitting: FAMILY MEDICINE

## 2022-04-15 DIAGNOSIS — Z78.0 MENOPAUSE: ICD-10-CM

## 2022-04-15 DIAGNOSIS — Z13.820 ENCOUNTER FOR SCREENING FOR OSTEOPOROSIS: ICD-10-CM

## 2022-04-15 PROCEDURE — 77080 DXA BONE DENSITY AXIAL: CPT

## 2022-07-29 ENCOUNTER — HOSPITAL ENCOUNTER (OUTPATIENT)
Facility: HOSPITAL | Age: 67
Setting detail: HOSPITAL OUTPATIENT SURGERY
Discharge: HOME | End: 2022-07-29
Attending: INTERNAL MEDICINE | Admitting: INTERNAL MEDICINE

## 2022-07-29 ENCOUNTER — ANESTHESIA EVENT (OUTPATIENT)
Dept: GASTROENTEROLOGY | Facility: HOSPITAL | Age: 67
End: 2022-07-29

## 2022-07-29 ENCOUNTER — ANESTHESIA (OUTPATIENT)
Dept: GASTROENTEROLOGY | Facility: HOSPITAL | Age: 67
End: 2022-07-29

## 2022-07-29 VITALS
WEIGHT: 163.1 LBS | OXYGEN SATURATION: 98 % | HEART RATE: 80 BPM | DIASTOLIC BLOOD PRESSURE: 75 MMHG | SYSTOLIC BLOOD PRESSURE: 137 MMHG | RESPIRATION RATE: 16 BRPM | BODY MASS INDEX: 28.9 KG/M2 | HEIGHT: 63 IN

## 2022-07-29 DIAGNOSIS — K63.5 COLON POLYP: ICD-10-CM

## 2022-07-29 LAB
ALBUMIN SERPL-MCNC: 3.9 G/DL (ref 3.5–5.2)
ALBUMIN/GLOB SERPL: 1.3 G/DL
ALP SERPL-CCNC: 59 U/L (ref 39–117)
ALT SERPL W P-5'-P-CCNC: 17 U/L (ref 1–33)
ANION GAP SERPL CALCULATED.3IONS-SCNC: 11.4 MMOL/L (ref 5–15)
AST SERPL-CCNC: 20 U/L (ref 1–32)
BASOPHILS # BLD AUTO: 0.03 10*3/MM3 (ref 0–0.2)
BASOPHILS NFR BLD AUTO: 0.5 % (ref 0–1.5)
BILIRUB SERPL-MCNC: 0.4 MG/DL (ref 0–1.2)
BUN SERPL-MCNC: 11 MG/DL (ref 8–23)
BUN/CREAT SERPL: 15.9 (ref 7–25)
CALCIUM SPEC-SCNC: 9.4 MG/DL (ref 8.6–10.5)
CEA SERPL-MCNC: 1.08 NG/ML
CHLORIDE SERPL-SCNC: 108 MMOL/L (ref 98–107)
CO2 SERPL-SCNC: 24.6 MMOL/L (ref 22–29)
CREAT SERPL-MCNC: 0.69 MG/DL (ref 0.57–1)
DEPRECATED RDW RBC AUTO: 38.6 FL (ref 37–54)
EGFRCR SERPLBLD CKD-EPI 2021: 95.3 ML/MIN/1.73
EOSINOPHIL # BLD AUTO: 0.01 10*3/MM3 (ref 0–0.4)
EOSINOPHIL NFR BLD AUTO: 0.2 % (ref 0.3–6.2)
ERYTHROCYTE [DISTWIDTH] IN BLOOD BY AUTOMATED COUNT: 11.9 % (ref 12.3–15.4)
GLOBULIN UR ELPH-MCNC: 3 GM/DL
GLUCOSE SERPL-MCNC: 91 MG/DL (ref 65–99)
HCT VFR BLD AUTO: 40.6 % (ref 34–46.6)
HGB BLD-MCNC: 13.5 G/DL (ref 12–15.9)
IMM GRANULOCYTES # BLD AUTO: 0.02 10*3/MM3 (ref 0–0.05)
IMM GRANULOCYTES NFR BLD AUTO: 0.3 % (ref 0–0.5)
LYMPHOCYTES # BLD AUTO: 0.78 10*3/MM3 (ref 0.7–3.1)
LYMPHOCYTES NFR BLD AUTO: 12.3 % (ref 19.6–45.3)
MCH RBC QN AUTO: 29.8 PG (ref 26.6–33)
MCHC RBC AUTO-ENTMCNC: 33.3 G/DL (ref 31.5–35.7)
MCV RBC AUTO: 89.6 FL (ref 79–97)
MONOCYTES # BLD AUTO: 0.31 10*3/MM3 (ref 0.1–0.9)
MONOCYTES NFR BLD AUTO: 4.9 % (ref 5–12)
NEUTROPHILS NFR BLD AUTO: 5.2 10*3/MM3 (ref 1.7–7)
NEUTROPHILS NFR BLD AUTO: 81.8 % (ref 42.7–76)
NRBC BLD AUTO-RTO: 0 /100 WBC (ref 0–0.2)
PLATELET # BLD AUTO: 219 10*3/MM3 (ref 140–450)
PMV BLD AUTO: 10.4 FL (ref 6–12)
POTASSIUM SERPL-SCNC: 3.6 MMOL/L (ref 3.5–5.2)
PROT SERPL-MCNC: 6.9 G/DL (ref 6–8.5)
RBC # BLD AUTO: 4.53 10*6/MM3 (ref 3.77–5.28)
SODIUM SERPL-SCNC: 144 MMOL/L (ref 136–145)
WBC NRBC COR # BLD: 6.35 10*3/MM3 (ref 3.4–10.8)

## 2022-07-29 PROCEDURE — 25010000002 PROPOFOL 10 MG/ML EMULSION

## 2022-07-29 PROCEDURE — 85025 COMPLETE CBC W/AUTO DIFF WBC: CPT | Performed by: INTERNAL MEDICINE

## 2022-07-29 PROCEDURE — 80053 COMPREHEN METABOLIC PANEL: CPT | Performed by: INTERNAL MEDICINE

## 2022-07-29 PROCEDURE — 88305 TISSUE EXAM BY PATHOLOGIST: CPT | Performed by: INTERNAL MEDICINE

## 2022-07-29 PROCEDURE — 45385 COLONOSCOPY W/LESION REMOVAL: CPT | Performed by: INTERNAL MEDICINE

## 2022-07-29 PROCEDURE — 82378 CARCINOEMBRYONIC ANTIGEN: CPT | Performed by: INTERNAL MEDICINE

## 2022-07-29 RX ORDER — SODIUM CHLORIDE, SODIUM LACTATE, POTASSIUM CHLORIDE, CALCIUM CHLORIDE 600; 310; 30; 20 MG/100ML; MG/100ML; MG/100ML; MG/100ML
1000 INJECTION, SOLUTION INTRAVENOUS CONTINUOUS
Status: DISCONTINUED | OUTPATIENT
Start: 2022-07-29 | End: 2022-07-29 | Stop reason: HOSPADM

## 2022-07-29 RX ORDER — PROPOFOL 10 MG/ML
VIAL (ML) INTRAVENOUS AS NEEDED
Status: DISCONTINUED | OUTPATIENT
Start: 2022-07-29 | End: 2022-07-29 | Stop reason: SURG

## 2022-07-29 RX ORDER — LIDOCAINE HYDROCHLORIDE 10 MG/ML
0.5 INJECTION, SOLUTION INFILTRATION; PERINEURAL ONCE AS NEEDED
Status: DISCONTINUED | OUTPATIENT
Start: 2022-07-29 | End: 2022-07-29 | Stop reason: HOSPADM

## 2022-07-29 RX ORDER — PROPOFOL 10 MG/ML
VIAL (ML) INTRAVENOUS CONTINUOUS PRN
Status: DISCONTINUED | OUTPATIENT
Start: 2022-07-29 | End: 2022-07-29 | Stop reason: SURG

## 2022-07-29 RX ORDER — SODIUM CHLORIDE 0.9 % (FLUSH) 0.9 %
10 SYRINGE (ML) INJECTION AS NEEDED
Status: DISCONTINUED | OUTPATIENT
Start: 2022-07-29 | End: 2022-07-29 | Stop reason: HOSPADM

## 2022-07-29 RX ORDER — LIDOCAINE HYDROCHLORIDE 20 MG/ML
INJECTION, SOLUTION INFILTRATION; PERINEURAL AS NEEDED
Status: DISCONTINUED | OUTPATIENT
Start: 2022-07-29 | End: 2022-07-29 | Stop reason: SURG

## 2022-07-29 RX ADMIN — PROPOFOL 100 MG: 10 INJECTION, EMULSION INTRAVENOUS at 09:47

## 2022-07-29 RX ADMIN — SODIUM CHLORIDE, POTASSIUM CHLORIDE, SODIUM LACTATE AND CALCIUM CHLORIDE 1000 ML: 600; 310; 30; 20 INJECTION, SOLUTION INTRAVENOUS at 09:11

## 2022-07-29 RX ADMIN — LIDOCAINE HYDROCHLORIDE 60 MG: 20 INJECTION, SOLUTION INFILTRATION; PERINEURAL at 09:47

## 2022-07-29 RX ADMIN — Medication 180 MCG/KG/MIN: at 09:47

## 2022-07-29 NOTE — ANESTHESIA POSTPROCEDURE EVALUATION
"Patient: Ranid Rios    Procedure Summary     Date: 07/29/22 Room / Location: [facility] ENDOSCOPY 6 /  [facility]    Anesthesia Start: 0941 Anesthesia Stop: 1021    Procedure: COLONOSCOPY INTO CECUM AND TI WITH COLD SNARE POLYPECTOMY, COLD BX POLYPECTOMY (N/A ) Diagnosis:       Colon polyp      (Colon polyp [K63.5])    Surgeons: Titus Greenwood MD Provider: Mono Alvares MD    Anesthesia Type: MAC ASA Status: 2          Anesthesia Type: MAC    Vitals  Vitals Value Taken Time   /76 07/29/22 1029   Temp     Pulse 84 07/29/22 1035   Resp 16 07/29/22 1029   SpO2 97 % 07/29/22 1035   Vitals shown include unvalidated device data.        Post Anesthesia Care and Evaluation    Patient location during evaluation: bedside  Patient participation: complete - patient participated  Level of consciousness: awake and alert  Pain management: adequate    Airway patency: patent  Anesthetic complications: No anesthetic complications  PONV Status: controlled  Cardiovascular status: acceptable  Respiratory status: acceptable  Hydration status: acceptable    Comments: /76   Pulse 80   Resp 16   Ht 158.8 cm (62.5\")   Wt 74 kg (163 lb 1.6 oz)   SpO2 98%   BMI 29.36 kg/m²         "

## 2022-07-29 NOTE — ANESTHESIA PREPROCEDURE EVALUATION
" Anesthesia Evaluation     Patient summary reviewed and Nursing notes reviewed   no history of anesthetic complications:  NPO Solid Status: > 8 hours  NPO Liquid Status: > 2 hours           Airway   Mallampati: II  Dental      Pulmonary - negative pulmonary ROS   Cardiovascular - negative cardio ROS  Exercise tolerance: good (4-7 METS)        Neuro/Psych  (+) headaches,    GI/Hepatic/Renal/Endo - negative ROS     Musculoskeletal (-) negative ROS    Abdominal    Substance History - negative use     OB/GYN negative ob/gyn ROS         Other      history of cancer remission                    Anesthesia Plan    ASA 2     MAC     (/86 (BP Location: Left arm, Patient Position: Lying)   Pulse 85   Resp 16   Ht 158.8 cm (62.5\")   Wt 74 kg (163 lb 1.6 oz)   SpO2 95%   BMI 29.36 kg/m²     I have reviewed the patient's history with the patient and the chart, including all pertinent laboratory results and imaging. I have explained the risks of anesthesia including but not limited to dental damage, corneal abrasion, nerve injury, MI, stroke, and death.    )    Anesthetic plan, risks, benefits, and alternatives have been provided, discussed and informed consent has been obtained with: patient.        CODE STATUS:       "

## 2022-08-01 ENCOUNTER — TELEPHONE (OUTPATIENT)
Dept: GASTROENTEROLOGY | Facility: CLINIC | Age: 67
End: 2022-08-01

## 2022-08-01 NOTE — TELEPHONE ENCOUNTER
----- Message from Titus Greenwood MD sent at 7/31/2022 12:38 PM EDT -----  07/31/22       Tell her that her labs look mostly good. We are still awaiting path from her recent colonoscopy. Send a copy of this report to her PCP.   Shana amaro

## 2022-08-02 LAB
LAB AP CASE REPORT: NORMAL
PATH REPORT.FINAL DX SPEC: NORMAL
PATH REPORT.GROSS SPEC: NORMAL

## 2022-09-07 ENCOUNTER — TELEPHONE (OUTPATIENT)
Dept: GASTROENTEROLOGY | Facility: CLINIC | Age: 67
End: 2022-09-07

## 2022-09-07 NOTE — TELEPHONE ENCOUNTER
Called pt and left vm for pt to call back.     Results sent to Dr Sahni thru [facility].

## 2022-09-07 NOTE — TELEPHONE ENCOUNTER
----- Message from Titus Greenwood MD sent at 9/7/2022  7:07 AM EDT -----  09/07/22       Tell her that the rectal polyps that were removed were not cancerous but were precancerous.  I would recommend that she follow-up with Dr. Padma Broussard (Colon Rectal Surgeon) because of her history of anal cancer and her anal stricture that I found during the colonoscopy.  Please schedule an appointment for the patient to see Dr. Nona Broussard if the patient is okay with that.       Please send a copy of this report to her PCP.  Shana amaro

## 2022-09-08 NOTE — TELEPHONE ENCOUNTER
Call to pt.  Advise per Dr Greenwood note.  Verb understanding.     States knows has scar tissue at rectum, but not having any issues.  Therefore asking if necessary now to see colorectal surgeon.  States if Dr Greenwood thinks this important, she certainly will - but unsure why this is needed at this time.     Question to DR greenwood.

## 2022-09-09 NOTE — TELEPHONE ENCOUNTER
The patient can do whatever she wants. If she were my family member I would have her go see Dr. Jairo Broussard because Dr. Broussard sees more anal cancer than I do and it (anal cancer) can be hard to diagnose. I want to make sure also that she doesn't have any issues with her anal stricture?

## 2023-08-14 ENCOUNTER — OFFICE VISIT (OUTPATIENT)
Dept: FAMILY MEDICINE CLINIC | Facility: CLINIC | Age: 68
End: 2023-08-14
Payer: MEDICARE

## 2023-08-14 VITALS
HEIGHT: 63 IN | WEIGHT: 166.6 LBS | HEART RATE: 85 BPM | OXYGEN SATURATION: 97 % | SYSTOLIC BLOOD PRESSURE: 134 MMHG | DIASTOLIC BLOOD PRESSURE: 85 MMHG | BODY MASS INDEX: 29.52 KG/M2

## 2023-08-14 DIAGNOSIS — Z00.00 MEDICARE ANNUAL WELLNESS VISIT, SUBSEQUENT: Primary | ICD-10-CM

## 2023-08-14 DIAGNOSIS — G43.109 MIGRAINE WITH AURA AND WITHOUT STATUS MIGRAINOSUS, NOT INTRACTABLE: ICD-10-CM

## 2023-08-14 PROCEDURE — 1160F RVW MEDS BY RX/DR IN RCRD: CPT | Performed by: FAMILY MEDICINE

## 2023-08-14 PROCEDURE — 1170F FXNL STATUS ASSESSED: CPT | Performed by: FAMILY MEDICINE

## 2023-08-14 PROCEDURE — 1159F MED LIST DOCD IN RCRD: CPT | Performed by: FAMILY MEDICINE

## 2023-08-14 PROCEDURE — G0439 PPPS, SUBSEQ VISIT: HCPCS | Performed by: FAMILY MEDICINE

## 2023-08-14 PROCEDURE — 99213 OFFICE O/P EST LOW 20 MIN: CPT | Performed by: FAMILY MEDICINE

## 2023-08-14 NOTE — PROGRESS NOTES
"The ABCs of the Annual Wellness Visit  Subsequent Medicare Wellness Visit    Subjective    Randi Rios is a 67 y.o. female who presents for a Subsequent Medicare Wellness Visit.    The following portions of the patient's history were reviewed and   updated as appropriate: allergies, current medications, past family history, past medical history, past social history, past surgical history, and problem list.    Compared to one year ago, the patient feels her physical   health is the same.    Compared to one year ago, the patient feels her mental   health is the same.    Recent Hospitalizations:  She was not admitted to the hospital during the last year.       Current Medical Providers:  Patient Care Team:  Breana Sahni, DO as PCP - General    Outpatient Medications Prior to Visit   Medication Sig Dispense Refill    ZOLMitriptan (Zomig ZMT) 5 MG disintegrating tablet Place 1 tablet on the tongue 1 (One) Time As Needed for Migraine for up to 1 dose. 9 tablet 0    valACYclovir (Valtrex) 1000 MG tablet Take 1 tablet by mouth 3 (Three) Times a Day. 21 tablet 0     No facility-administered medications prior to visit.       No opioid medication identified on active medication list. I have reviewed chart for other potential  high risk medication/s and harmful drug interactions in the elderly.        Aspirin is not on active medication list.  Aspirin use is not indicated based on review of current medical condition/s. Risk of harm outweighs potential benefits.  .    Patient Active Problem List   Diagnosis    Colon polyp     Advance Care Planning   Advance Care Planning     Advance Directive is on file.  ACP discussion was held with the patient during this visit. Patient has an advance directive in EMR which is still valid.      Objective    Vitals:    08/14/23 1058   BP: 134/85   Pulse: 85   SpO2: 97%   Weight: 75.6 kg (166 lb 9.6 oz)   Height: 160 cm (63\")     Estimated body mass index is 29.51 kg/mý as calculated " "from the following:    Height as of this encounter: 160 cm (63\").    Weight as of this encounter: 75.6 kg (166 lb 9.6 oz).    BMI is >= 25 and <30. (Overweight) The following options were offered after discussion;: exercise counseling/recommendations and nutrition counseling/recommendations      Does the patient have evidence of cognitive impairment? No          HEALTH RISK ASSESSMENT    Smoking Status:  Social History     Tobacco Use   Smoking Status Never   Smokeless Tobacco Never     Alcohol Consumption:  Social History     Substance and Sexual Activity   Alcohol Use Yes    Alcohol/week: 1.0 standard drink    Types: 1 Glasses of wine per week     Fall Risk Screen:    STEADI Fall Risk Assessment was completed, and patient is at LOW risk for falls.Assessment completed on:2023    Depression Screenin/14/2023    10:56 AM   PHQ-2/PHQ-9 Depression Screening   Little Interest or Pleasure in Doing Things 0-->not at all   Feeling Down, Depressed or Hopeless 0-->not at all   PHQ-9: Brief Depression Severity Measure Score 0       Health Habits and Functional and Cognitive Screenin/14/2023    10:55 AM   Functional & Cognitive Status   Do you have difficulty preparing food and eating? No   Do you have difficulty bathing yourself, getting dressed or grooming yourself? No   Do you have difficulty using the toilet? No   Do you have difficulty moving around from place to place? No   Do you have trouble with steps or getting out of a bed or a chair? No   Current Diet Unhealthy Diet   Dental Exam Up to date   Eye Exam Up to date   Exercise (times per week) 7 times per week   Current Exercises Include Walking   Do you need help using the phone?  No   Are you deaf or do you have serious difficulty hearing?  No   Do you need help to go to places out of walking distance? No   Do you need help shopping? No   Do you need help preparing meals?  No   Do you need help with housework?  No   Do you need help with " laundry? No   Do you need help taking your medications? No   Do you need help managing money? No   Do you ever drive or ride in a car without wearing a seat belt? No       Age-appropriate Screening Schedule:  Refer to the list below for future screening recommendations based on patient's age, sex and/or medical conditions. Orders for these recommended tests are listed in the plan section. The patient has been provided with a written plan.    Health Maintenance   Topic Date Due    Pneumococcal Vaccine 65+ (2 - PPSV23 or PCV20) 11/18/2021    ANNUAL WELLNESS VISIT  11/22/2022    COVID-19 Vaccine (6 - Moderna series) 02/24/2023    INFLUENZA VACCINE  10/01/2023    DXA SCAN  04/15/2024    MAMMOGRAM  07/12/2025    COLORECTAL CANCER SCREENING  07/29/2027    TDAP/TD VACCINES (2 - Td or Tdap) 05/14/2029    HEPATITIS C SCREENING  Completed    ZOSTER VACCINE  Completed                  CMS Preventative Services Quick Reference  Risk Factors Identified During Encounter  Immunizations Discussed/Encouraged: Influenza, Prevnar 20 (Pneumococcal 20-valent conjugate), Shingrix, and COVID19  Dental Screening Recommended  Vision Screening Recommended  The above risks/problems have been discussed with the patient.  Pertinent information has been shared with the patient in the After Visit Summary.  An After Visit Summary and PPPS were made available to the patient.    Follow Up:   Next Medicare Wellness visit to be scheduled in 1 year.       Additional E&M Note during same encounter follows:  Patient has multiple medical problems which are significant and separately identifiable that require additional work above and beyond the Medicare Wellness Visit.      Chief Complaint  Medicare Wellness-subsequent    Subjective        HPI  Randi Rios is also being seen today for follow up of migraines.  She currently is having 2 migraines a month which is more than in the past. She is does well with Zomig and it works well.  No side effects.   "She has been more stressed lately.           Objective   Vital Signs:  /85   Pulse 85   Ht 160 cm (63\")   Wt 75.6 kg (166 lb 9.6 oz)   SpO2 97%   BMI 29.51 kg/mý     Physical Exam  Vitals and nursing note reviewed.   Constitutional:       Appearance: Normal appearance. She is well-developed and overweight.   HENT:      Head: Normocephalic and atraumatic.      Right Ear: External ear normal.      Left Ear: External ear normal.      Nose: Nose normal.   Eyes:      General: No scleral icterus.     Conjunctiva/sclera: Conjunctivae normal.   Cardiovascular:      Rate and Rhythm: Normal rate and regular rhythm.      Heart sounds: Normal heart sounds.   Pulmonary:      Effort: Pulmonary effort is normal.      Breath sounds: Normal breath sounds.   Musculoskeletal:      Cervical back: Normal range of motion and neck supple.      Right lower leg: No edema.      Left lower leg: No edema.   Lymphadenopathy:      Cervical: No cervical adenopathy.   Skin:     General: Skin is warm and dry.      Findings: No rash.   Neurological:      Mental Status: She is alert and oriented to person, place, and time.   Psychiatric:         Mood and Affect: Mood normal.         Behavior: Behavior normal.         Thought Content: Thought content normal.         Judgment: Judgment normal.        The following data was reviewed by: Breana Sahni DO on 08/14/2023:                 Assessment and Plan   Diagnoses and all orders for this visit:    1. Medicare annual wellness visit, subsequent (Primary)    2. Migraine with aura and without status migrainosus, not intractable    RHM.  Up to date.    Patient is also here for follow-up of worsening migraine headaches.  I do believe this is due to stress.  Patient expects that the stress should be better over the next several months.  Continue Zomig as directed.  If migraines continue to worsen patient was advised to follow-up.         Follow Up   Return in about 1 year (around " 8/14/2024).  Patient was given instructions and counseling regarding her condition or for health maintenance advice. Please see specific information pulled into the AVS if appropriate.

## 2023-10-02 ENCOUNTER — OFFICE VISIT (OUTPATIENT)
Dept: FAMILY MEDICINE CLINIC | Facility: CLINIC | Age: 68
End: 2023-10-02
Payer: MEDICARE

## 2023-10-02 ENCOUNTER — HOSPITAL ENCOUNTER (OUTPATIENT)
Dept: CARDIOLOGY | Facility: HOSPITAL | Age: 68
Discharge: HOME | End: 2023-10-02
Admitting: FAMILY MEDICINE
Payer: MEDICARE

## 2023-10-02 VITALS
BODY MASS INDEX: 30.37 KG/M2 | HEIGHT: 63 IN | SYSTOLIC BLOOD PRESSURE: 122 MMHG | OXYGEN SATURATION: 100 % | WEIGHT: 171.4 LBS | HEART RATE: 71 BPM | DIASTOLIC BLOOD PRESSURE: 66 MMHG

## 2023-10-02 DIAGNOSIS — M79.89 PAIN AND SWELLING OF LEFT LOWER EXTREMITY: ICD-10-CM

## 2023-10-02 DIAGNOSIS — M79.605 PAIN AND SWELLING OF LEFT LOWER EXTREMITY: Primary | ICD-10-CM

## 2023-10-02 DIAGNOSIS — M79.89 PAIN AND SWELLING OF LEFT LOWER EXTREMITY: Primary | ICD-10-CM

## 2023-10-02 DIAGNOSIS — M79.605 PAIN AND SWELLING OF LEFT LOWER EXTREMITY: ICD-10-CM

## 2023-10-02 LAB
BH CV LOWER VASCULAR LEFT COMMON FEMORAL AUGMENT: NORMAL
BH CV LOWER VASCULAR LEFT COMMON FEMORAL COMPETENT: NORMAL
BH CV LOWER VASCULAR LEFT COMMON FEMORAL COMPRESS: NORMAL
BH CV LOWER VASCULAR LEFT COMMON FEMORAL PHASIC: NORMAL
BH CV LOWER VASCULAR LEFT COMMON FEMORAL SPONT: NORMAL
BH CV LOWER VASCULAR LEFT DISTAL FEMORAL COMPRESS: NORMAL
BH CV LOWER VASCULAR LEFT GASTRONEMIUS COMPRESS: NORMAL
BH CV LOWER VASCULAR LEFT GREATER SAPH AK COMPRESS: NORMAL
BH CV LOWER VASCULAR LEFT GREATER SAPH BK COMPRESS: NORMAL
BH CV LOWER VASCULAR LEFT LESSER SAPH COMPRESS: NORMAL
BH CV LOWER VASCULAR LEFT MID FEMORAL AUGMENT: NORMAL
BH CV LOWER VASCULAR LEFT MID FEMORAL COMPETENT: NORMAL
BH CV LOWER VASCULAR LEFT MID FEMORAL COMPRESS: NORMAL
BH CV LOWER VASCULAR LEFT MID FEMORAL PHASIC: NORMAL
BH CV LOWER VASCULAR LEFT MID FEMORAL SPONT: NORMAL
BH CV LOWER VASCULAR LEFT PERONEAL COMPRESS: NORMAL
BH CV LOWER VASCULAR LEFT POPLITEAL AUGMENT: NORMAL
BH CV LOWER VASCULAR LEFT POPLITEAL COMPETENT: NORMAL
BH CV LOWER VASCULAR LEFT POPLITEAL COMPRESS: NORMAL
BH CV LOWER VASCULAR LEFT POPLITEAL PHASIC: NORMAL
BH CV LOWER VASCULAR LEFT POPLITEAL SPONT: NORMAL
BH CV LOWER VASCULAR LEFT POSTERIOR TIBIAL COMPRESS: NORMAL
BH CV LOWER VASCULAR LEFT PROFUNDA FEMORAL COMPRESS: NORMAL
BH CV LOWER VASCULAR LEFT PROXIMAL FEMORAL COMPRESS: NORMAL
BH CV LOWER VASCULAR LEFT SAPHENOFEMORAL JUNCTION COMPRESS: NORMAL
BH CV LOWER VASCULAR RIGHT COMMON FEMORAL AUGMENT: NORMAL
BH CV LOWER VASCULAR RIGHT COMMON FEMORAL COMPETENT: NORMAL
BH CV LOWER VASCULAR RIGHT COMMON FEMORAL COMPRESS: NORMAL
BH CV LOWER VASCULAR RIGHT COMMON FEMORAL PHASIC: NORMAL
BH CV LOWER VASCULAR RIGHT COMMON FEMORAL SPONT: NORMAL

## 2023-10-02 PROCEDURE — 99213 OFFICE O/P EST LOW 20 MIN: CPT | Performed by: FAMILY MEDICINE

## 2023-10-02 PROCEDURE — 93971 EXTREMITY STUDY: CPT

## 2023-10-02 NOTE — PROGRESS NOTES
"Chief Complaint  Edema (Left leg)    Subjective        Randi Rios presents to [facility]  History of Present Illness  Patient is here today for a few new symptoms.  She states that her left lower extremity has been intermittently slightly swollen and bigger than the right for a couple of years.  However over the last month it has felt tight and itchy and has been consistently swollen.  At the same time the patient has been having left knee pain.  It is worse with walking or going down stairs.  No known injury.  Patient states that her knee pain is better if she takes ibuprofen.    Objective   Vital Signs:  /66   Pulse 71   Ht 160 cm (63\")   Wt 77.7 kg (171 lb 6.4 oz)   SpO2 100%   BMI 30.36 kg/m²   Estimated body mass index is 30.36 kg/m² as calculated from the following:    Height as of this encounter: 160 cm (63\").    Weight as of this encounter: 77.7 kg (171 lb 6.4 oz).               Physical Exam  Vitals and nursing note reviewed.   Constitutional:       Appearance: Normal appearance. She is well-developed.   HENT:      Head: Normocephalic and atraumatic.   Eyes:      Conjunctiva/sclera: Conjunctivae normal.   Pulmonary:      Effort: Pulmonary effort is normal.   Musculoskeletal:         General: Normal range of motion.      Cervical back: Normal range of motion and neck supple.      Right lower leg: Edema (Trace edema to ankle) present.      Left lower leg: Edema (1+ pitting edema to knee) present.      Comments: With medial strain testing of the left knee the patient had pain medially but then with lateral strain testing there was a loud \"pop\" appreciated.  The patient states that her knee pain actually felt better afterward.  In fact she said that when she walked around afterward her knee pain was better.  There was mild medial joint space effusion of the left knee.  No induration, erythema, or ecchymosis noted.  No gross deformity noted.   Skin:     General: " Skin is warm and dry.      Capillary Refill: Capillary refill takes less than 2 seconds.      Findings: No rash.   Neurological:      Mental Status: She is alert and oriented to person, place, and time.   Psychiatric:         Mood and Affect: Mood normal.         Behavior: Behavior normal.         Thought Content: Thought content normal.         Judgment: Judgment normal.      Result Review :      Data reviewed : Radiologic studies see below  Duplex Venous Lower Extremity - Left CAR (10/02/2023 15:21)            Assessment and Plan   Diagnoses and all orders for this visit:    1. Pain and swelling of left lower extremity (Primary)  -     Duplex Venous Lower Extremity - Left CAR; Future    Patient is here today with a new problem of pain and swelling in her left lower extremity.  I first must rule out DVT with a venous duplex.  If the ultrasound is negative for DVT then the likely cause of swelling is venous insufficiency versus inflammation due to internal derangement of the knee such as a ruptured Baker's cyst.  The conservative management of both is compression stockings and ice.  If symptoms persist or worsen the patient was advised to follow-up.         Follow Up   Return for Per results.  Patient was given instructions and counseling regarding her condition or for health maintenance advice. Please see specific information pulled into the AVS if appropriate.       Answers submitted by the patient for this visit:  Other (Submitted on 9/28/2023)  Please describe your symptoms.: Left knee/calf/ankle/foot has been swollen/larger than right for a long time (years), but with no discomfort. However, in the last month or so, the leg feels tight and achy. , About three weeks ago, my left knee started bothering me. I thought it was because I was sitting with my leg curled under me, but I stopped that immediately and the knee continues to hurt - mostly the top/inside of knee. It is sore all the time, with discomfort  exacerbated by walking/going down steps. It is especially bad when I first stand up. Have tried heat, cold, elevating - doesn't really help. Discomfort sometimes keeps me awake at night and is starting to interfere with activities.  Have you had these symptoms before?: Yes  How long have you been having these symptoms?: Greater than 2 weeks  Please list any medications you are currently taking for this condition.: Tried motrin & tylenol ocassionally.  Please describe any probable cause for these symptoms. : Trying to stay off webmed so I don't convince myself it is terminal.  . . I don't know. Old age?  Primary Reason for Visit (Submitted on 9/28/2023)  What is the primary reason for your visit?: Other

## 2024-08-15 ENCOUNTER — OFFICE VISIT (OUTPATIENT)
Dept: FAMILY MEDICINE CLINIC | Facility: CLINIC | Age: 69
End: 2024-08-15
Payer: MEDICARE

## 2024-08-15 VITALS
WEIGHT: 156.4 LBS | HEART RATE: 66 BPM | BODY MASS INDEX: 27.71 KG/M2 | OXYGEN SATURATION: 98 % | SYSTOLIC BLOOD PRESSURE: 135 MMHG | HEIGHT: 63 IN | DIASTOLIC BLOOD PRESSURE: 78 MMHG

## 2024-08-15 DIAGNOSIS — Z13.220 ENCOUNTER FOR LIPID SCREENING FOR CARDIOVASCULAR DISEASE: ICD-10-CM

## 2024-08-15 DIAGNOSIS — Z13.820 ENCOUNTER FOR OSTEOPOROSIS SCREENING IN ASYMPTOMATIC POSTMENOPAUSAL PATIENT: ICD-10-CM

## 2024-08-15 DIAGNOSIS — Z78.0 ENCOUNTER FOR OSTEOPOROSIS SCREENING IN ASYMPTOMATIC POSTMENOPAUSAL PATIENT: ICD-10-CM

## 2024-08-15 DIAGNOSIS — Z13.1 SCREENING FOR DIABETES MELLITUS: ICD-10-CM

## 2024-08-15 DIAGNOSIS — G43.109 MIGRAINE WITH AURA AND WITHOUT STATUS MIGRAINOSUS, NOT INTRACTABLE: ICD-10-CM

## 2024-08-15 DIAGNOSIS — Z00.00 MEDICARE ANNUAL WELLNESS VISIT, SUBSEQUENT: Primary | ICD-10-CM

## 2024-08-15 DIAGNOSIS — Z13.6 ENCOUNTER FOR LIPID SCREENING FOR CARDIOVASCULAR DISEASE: ICD-10-CM

## 2024-08-15 DIAGNOSIS — Z23 NEED FOR PNEUMOCOCCAL 20-VALENT CONJUGATE VACCINATION: ICD-10-CM

## 2024-08-15 NOTE — PROGRESS NOTES
" Subjective   The ABCs of the Annual Wellness Visit  Medicare Wellness Visit      Randi Rios is a 68 y.o. patient who presents for a Medicare Wellness Visit.    The following portions of the patient's history were reviewed and   updated as appropriate: allergies, current medications, past family history, past medical history, past social history, past surgical history, and problem list.    Compared to one year ago, the patient's physical   health is the same.  Compared to one year ago, the patient's mental   health is the same.    Recent Hospitalizations:  She was not admitted to the hospital during the last year.     Current Medical Providers:  Patient Care Team:  Breana Sahni,  as PCP - General    Outpatient Medications Prior to Visit   Medication Sig Dispense Refill    ZOLMitriptan (Zomig ZMT) 5 MG disintegrating tablet Place 1 tablet on the tongue 1 (One) Time As Needed for Migraine for up to 1 dose. 9 tablet 0     No facility-administered medications prior to visit.     No opioid medication identified on active medication list. I have reviewed chart for other potential  high risk medication/s and harmful drug interactions in the elderly.      Aspirin is not on active medication list.  Aspirin use is not indicated based on review of current medical condition/s. Risk of harm outweighs potential benefits.  .    Patient Active Problem List   Diagnosis    Colon polyp     Advance Care Planning Advance Directive is on file.  ACP discussion was held with the patient during this visit. Patient has an advance directive in EMR which is still valid.             Objective   Vitals:    08/15/24 1054   BP: 135/78   Pulse: 66   SpO2: 98%   Weight: 70.9 kg (156 lb 6.4 oz)   Height: 160 cm (63\")       Estimated body mass index is 27.71 kg/m² as calculated from the following:    Height as of this encounter: 160 cm (63\").    Weight as of this encounter: 70.9 kg (156 lb 6.4 oz).    BMI is >= 25 and <30. (Overweight) The " following options were offered after discussion;: exercise counseling/recommendations and nutrition counseling/recommendations       Does the patient have evidence of cognitive impairment? No                                                                                                Health  Risk Assessment    Smoking Status:  Social History     Tobacco Use   Smoking Status Never   Smokeless Tobacco Never     Alcohol Consumption:  Social History     Substance and Sexual Activity   Alcohol Use Yes    Alcohol/week: 1.0 standard drink of alcohol    Types: 1 Glasses of wine per week       Fall Risk Screen  SERGEYADI Fall Risk Assessment was completed, and patient is at LOW risk for falls.Assessment completed on:8/15/2024    Depression Screenin/15/2024    10:52 AM   PHQ-2/PHQ-9 Depression Screening   Little Interest or Pleasure in Doing Things 0-->not at all   Feeling Down, Depressed or Hopeless 0-->not at all   PHQ-9: Brief Depression Severity Measure Score 0     Health Habits and Functional and Cognitive Screenin/15/2024    10:53 AM   Functional & Cognitive Status   Do you have difficulty preparing food and eating? No   Do you have difficulty bathing yourself, getting dressed or grooming yourself? No   Do you have difficulty using the toilet? No   Do you have difficulty moving around from place to place? No   Do you have trouble with steps or getting out of a bed or a chair? No   Current Diet Well Balanced Diet   Dental Exam Up to date   Eye Exam Up to date   Exercise (times per week) 7 times per week   Current Exercises Include Walking;Stationary Bicycling/Spin Class   Do you need help using the phone?  No   Are you deaf or do you have serious difficulty hearing?  No   Do you need help to go to places out of walking distance? No   Do you need help shopping? No   Do you need help preparing meals?  No   Do you need help with housework?  No   Do you need help with laundry? No   Do you need help taking  your medications? No   Do you need help managing money? No   Do you ever drive or ride in a car without wearing a seat belt? No   Have you felt unusual stress, anger or loneliness in the last month? No   Who do you live with? Alone   If you need help, do you have trouble finding someone available to you? No   Have you been bothered in the last four weeks by sexual problems? No   Do you have difficulty concentrating, remembering or making decisions? No           Age-appropriate Screening Schedule:  Refer to the list below for future screening recommendations based on patient's age, sex and/or medical conditions. Orders for these recommended tests are listed in the plan section. The patient has been provided with a written plan.    Health Maintenance List  Health Maintenance   Topic Date Due    COVID-19 Vaccine (7 - 2023-24 season) 02/12/2024    DXA SCAN  04/15/2024    BMI FOLLOWUP  08/14/2024    INFLUENZA VACCINE  08/01/2024    ANNUAL WELLNESS VISIT  08/15/2025    MAMMOGRAM  07/18/2026    COLORECTAL CANCER SCREENING  07/29/2027    TDAP/TD VACCINES (2 - Td or Tdap) 05/14/2029    HEPATITIS C SCREENING  Completed    Pneumococcal Vaccine 65+  Completed    ZOSTER VACCINE  Completed                                                                                                                                                CMS Preventative Services Quick Reference  Risk Factors Identified During Encounter  Immunizations Discussed/Encouraged: Influenza, Prevnar 20 (Pneumococcal 20-valent conjugate), Shingrix, COVID19, and RSV (Respiratory Syncytial Virus)  Dental Screening Recommended  Vision Screening Recommended    The above risks/problems have been discussed with the patient.  Pertinent information has been shared with the patient in the After Visit Summary.  An After Visit Summary and PPPS were made available to the patient.    Follow Up:   Next Medicare Wellness visit to be scheduled in 1 year.         Additional E&M  "Note during same encounter follows:  Patient has additional, significant, and separately identifiable condition(s)/problem(s) that require work above and beyond the Medicare Wellness Visit     Chief Complaint  Medicare Wellness-subsequent    Subjective   HPI  Annalisa is also being seen today for an annual adult preventative physical exam.  and Annalisa is also being seen today for follow up of migraines. She only uses Zomig if she has to and it works well.  But she has also noticed that it causes elevations in her blood pressures so she uses it sparingly.          Objective   Vital Signs:  /78   Pulse 66   Ht 160 cm (63\")   Wt 70.9 kg (156 lb 6.4 oz)   SpO2 98%   BMI 27.71 kg/m²   Physical Exam  Vitals and nursing note reviewed.   Constitutional:       Appearance: Normal appearance. She is well-developed.   HENT:      Head: Normocephalic and atraumatic.   Eyes:      Conjunctiva/sclera: Conjunctivae normal.   Cardiovascular:      Rate and Rhythm: Normal rate and regular rhythm.      Heart sounds: Normal heart sounds.   Pulmonary:      Effort: Pulmonary effort is normal.      Breath sounds: Normal breath sounds.   Abdominal:      General: Bowel sounds are normal.      Palpations: Abdomen is soft.   Musculoskeletal:         General: Normal range of motion.      Cervical back: Normal range of motion and neck supple.   Skin:     General: Skin is warm and dry.      Capillary Refill: Capillary refill takes less than 2 seconds.      Findings: No rash.   Neurological:      Mental Status: She is alert and oriented to person, place, and time.   Psychiatric:         Mood and Affect: Mood normal.         Behavior: Behavior normal.         Thought Content: Thought content normal.         Judgment: Judgment normal.                 Assessment and Plan Additional age appropriate preventative wellness advice topics were discussed during today's preventative wellness exam(some topics already addressed during AWV portion of the " note above):    Physical Activity: Advised cardiovascular activity 150 minutes per week as tolerated. (example brisk walk for 30 minutes, 5 days a week).     Nutrition: Discussed nutrition plan with patient. Information shared in after visit summary. Goal is for a well balanced diet to enhance overall health.     Healthy Weight: Discussed current and goal BMI with patient. Steps to attain this goal discussed. Information shared in after visit summary.              Medicare annual wellness visit, subsequent    Migraine with aura and without status migrainosus, not intractable            Encounter for lipid screening for cardiovascular disease    Screening for diabetes mellitus    Encounter for osteoporosis screening in asymptomatic postmenopausal patient    Need for pneumococcal 20-valent conjugate vaccination      Orders Placed This Encounter   Procedures    DEXA Bone Density Axial     Standing Status:   Future     Standing Expiration Date:   8/15/2025     Order Specific Question:   Reason for Exam:     Answer:   Screening for osteoporosis, menopause     Order Specific Question:   Release to patient     Answer:   Routine Release [[id number]]     Order Specific Question:   Is patient taking or have taken long term Glucocorticoid (steroids)?     Answer:   No     Order Specific Question:   Does the patient have rheumatoid arthritis?     Answer:   No     Order Specific Question:   Does the patient have secondary osteoporosis?     Answer:   No    Pneumococcal Conjugate Vaccine 20-Valent All    Lipid Panel     Order Specific Question:   Release to patient     Answer:   Routine Release [[id number]]    Comprehensive Metabolic Panel     Order Specific Question:   Release to patient     Answer:   Routine Release [[id number]]     Routine health maintenance.  DEXA scan order entered, lipids and CMP order entered, PCV 20 given today.  Other vaccine recommendations discussed.    Patient is also here to follow-up on migraine  headaches.  Overall her headaches have been much better.  Advised that she use Zomig sparingly and follow-up if her blood pressures remain greater than 140/85 consistently.          Follow Up   Return in about 1 year (around 8/15/2025) for Medicare Wellness.  Patient was given instructions and counseling regarding her condition or for health maintenance advice. Please see specific information pulled into the AVS if appropriate.

## 2024-08-16 LAB
ALBUMIN SERPL-MCNC: 3.9 G/DL (ref 3.9–4.9)
ALP SERPL-CCNC: 62 IU/L (ref 44–121)
ALT SERPL-CCNC: 17 IU/L (ref 0–32)
AST SERPL-CCNC: 25 IU/L (ref 0–40)
BILIRUB SERPL-MCNC: 0.3 MG/DL (ref 0–1.2)
BUN SERPL-MCNC: 17 MG/DL (ref 8–27)
BUN/CREAT SERPL: 23 (ref 12–28)
CALCIUM SERPL-MCNC: 9.5 MG/DL (ref 8.7–10.3)
CHLORIDE SERPL-SCNC: 102 MMOL/L (ref 96–106)
CHOLEST SERPL-MCNC: 175 MG/DL (ref 100–199)
CO2 SERPL-SCNC: 25 MMOL/L (ref 20–29)
CREAT SERPL-MCNC: 0.74 MG/DL (ref 0.57–1)
EGFRCR SERPLBLD CKD-EPI 2021: 88 ML/MIN/1.73
GLOBULIN SER CALC-MCNC: 2.7 G/DL (ref 1.5–4.5)
GLUCOSE SERPL-MCNC: 90 MG/DL (ref 70–99)
HDLC SERPL-MCNC: 63 MG/DL
LDLC SERPL CALC-MCNC: 101 MG/DL (ref 0–99)
POTASSIUM SERPL-SCNC: 4.5 MMOL/L (ref 3.5–5.2)
PROT SERPL-MCNC: 6.6 G/DL (ref 6–8.5)
SODIUM SERPL-SCNC: 140 MMOL/L (ref 134–144)
TRIGL SERPL-MCNC: 54 MG/DL (ref 0–149)
VLDLC SERPL CALC-MCNC: 11 MG/DL (ref 5–40)

## 2024-09-08 ENCOUNTER — HOSPITAL ENCOUNTER (OUTPATIENT)
Dept: BONE DENSITY | Facility: HOSPITAL | Age: 69
Discharge: HOME | End: 2024-09-08
Admitting: FAMILY MEDICINE
Payer: MEDICARE

## 2024-09-08 DIAGNOSIS — Z78.0 ENCOUNTER FOR OSTEOPOROSIS SCREENING IN ASYMPTOMATIC POSTMENOPAUSAL PATIENT: ICD-10-CM

## 2024-09-08 DIAGNOSIS — Z13.820 ENCOUNTER FOR OSTEOPOROSIS SCREENING IN ASYMPTOMATIC POSTMENOPAUSAL PATIENT: ICD-10-CM

## 2024-09-08 PROCEDURE — 77080 DXA BONE DENSITY AXIAL: CPT

## 2024-11-25 ENCOUNTER — TELEPHONE (OUTPATIENT)
Dept: FAMILY MEDICINE CLINIC | Facility: CLINIC | Age: 69
End: 2024-11-25

## 2024-11-25 DIAGNOSIS — M25.561 CHRONIC PAIN OF BOTH KNEES: Primary | ICD-10-CM

## 2024-11-25 DIAGNOSIS — G89.29 CHRONIC PAIN OF BOTH KNEES: Primary | ICD-10-CM

## 2024-11-25 DIAGNOSIS — M25.562 CHRONIC PAIN OF BOTH KNEES: Primary | ICD-10-CM

## 2024-11-25 NOTE — TELEPHONE ENCOUNTER
"DELETE AFTER REVIEWING: Send this encounter to the appropriate pool. See your Call Action Grid or Workflows for direction.        Hub staff attempted to follow warm transfer process and was unsuccessful     Caller: Randi Rios \"Annalisa\"    Relationship to patient: Self    Best call back number: [phone]    Patient is needing: PATIENT STATES THAT ABOUT A YEAR AGO SHE TORN HER MENISCUS. SHE STATES THAT IT HAS BEEN BOTHERING HER LATELY, AND WOULD LIKE TO SEE IF DR. MICHAELS WILL REFER HER PHYSICAL THERAPY? THIS WAS MENTIONED AS A NEXT STEP IF THIS DIDN'T IMPROVE. PLEASE ADVISE.         DELETE AFTER READING TO PATIENT: “I was unable to reach my scheduling contact. I will send a message to the scheduling team. Please allow 48 hours for the  staff to follow up on this request.”    "

## 2024-12-03 NOTE — TELEPHONE ENCOUNTER
"  Caller: Randi Rios \"Annalisa\"    Relationship: Self    Best call back number: [phone]     What is the best time to reach you: AFTER 1:00    What was the call regarding: PATIENT IS REQUESTING TO KNOW IF HER PHYSICAL THERAPY REFERRAL HAS BEEN SENT TO [facility].    PLEASE CONTACT TO ADVISE SO PATIENT MAY SCHEDULE PHYSICAL THERAPY.    Is it okay if the provider responds through MyChart: YES  "

## 2024-12-13 ENCOUNTER — TELEPHONE (OUTPATIENT)
Dept: ORTHOPEDIC SURGERY | Facility: CLINIC | Age: 69
End: 2024-12-13
Payer: MEDICARE

## 2024-12-17 ENCOUNTER — OFFICE VISIT (OUTPATIENT)
Dept: ORTHOPEDIC SURGERY | Facility: CLINIC | Age: 69
End: 2024-12-17
Payer: MEDICARE

## 2024-12-17 VITALS — TEMPERATURE: 96.9 F | WEIGHT: 144.1 LBS | HEIGHT: 63 IN | BODY MASS INDEX: 25.53 KG/M2

## 2024-12-17 DIAGNOSIS — G89.29 CHRONIC PAIN OF LEFT KNEE: ICD-10-CM

## 2024-12-17 DIAGNOSIS — S83.207A POSITIVE MCMURRAY TEST OF LEFT KNEE, INITIAL ENCOUNTER: Primary | ICD-10-CM

## 2024-12-17 DIAGNOSIS — M25.562 CHRONIC PAIN OF LEFT KNEE: ICD-10-CM

## 2024-12-17 RX ORDER — LIDOCAINE HYDROCHLORIDE 10 MG/ML
2 INJECTION, SOLUTION EPIDURAL; INFILTRATION; INTRACAUDAL; PERINEURAL
Status: COMPLETED | OUTPATIENT
Start: 2024-12-17 | End: 2024-12-17

## 2024-12-17 RX ORDER — METHYLPREDNISOLONE ACETATE 80 MG/ML
80 INJECTION, SUSPENSION INTRA-ARTICULAR; INTRALESIONAL; INTRAMUSCULAR; SOFT TISSUE
Status: COMPLETED | OUTPATIENT
Start: 2024-12-17 | End: 2024-12-17

## 2024-12-17 RX ADMIN — METHYLPREDNISOLONE ACETATE 80 MG: 80 INJECTION, SUSPENSION INTRA-ARTICULAR; INTRALESIONAL; INTRAMUSCULAR; SOFT TISSUE at 09:42

## 2024-12-17 RX ADMIN — LIDOCAINE HYDROCHLORIDE 2 ML: 10 INJECTION, SOLUTION EPIDURAL; INFILTRATION; INTRACAUDAL; PERINEURAL at 09:42

## 2024-12-17 NOTE — PROGRESS NOTES
[facility]              New Problem      Patient Name: Randi Rios  : 1955  Primary Care Physician: Breana Sahni DO        Chief Complaint:  Left knee pain    HPI:   Randi Rios is a 69 y.o. year old who presents today for evaluation.   History of Present Illness  The patient presents for evaluation of knee pain.    Approximately one year ago, she experienced an incident where she inadvertently tucked her leg beneath her while sitting on a couch, resulting in immediate pain. Despite the application of the RICE protocol, the pain persisted, leading to a consultation with her primary care physician, Dr. Sahni, who suspected a torn meniscus. The pain has been severe enough to disrupt her sleep and has escalated to a constant discomfort over the past three months. This has significantly impacted her daily activities, including her ability to walk her dogs for 2 miles. She has not engaged in any formal physical therapy but has been self-managing her symptoms through exercise. The pain, which is localized to a specific area of the knee medial, has not caused sleep disturbances in recent weeks. She describes the pain as always present, but varying in intensity, with some days being more tolerable than others. She has been using ibuprofen as needed for sleep. She has a scheduled trip to [place] on  and is seeking relief from her symptoms before her departure.    ALLERGIES  The patient has no known allergies.    MEDICATIONS  Current: ibuprofen    Past Medical/Surgical, Social and Family History:  I have reviewed and/or updated pertinent history as noted in the medical record including:  Past Medical History:   Diagnosis Date    Cancer     squamous cell anal cancer, tx with radiation and chemo    Colon polyps     Fracture, foot -    HL (hearing loss)     Got hearing aids    Migraine     S/P breast biopsy, right 2016    BENIGN/ STEROTACTIC FOR CALCIFICATIONS    Tear of meniscus  of knee      Past Surgical History:   Procedure Laterality Date    BREAST BIOPSY      COLONOSCOPY      COLONOSCOPY N/A 07/29/2022    Procedure: COLONOSCOPY INTO CECUM AND TI WITH COLD SNARE POLYPECTOMY, COLD BX POLYPECTOMY;  Surgeon: Titus Greenwood MD;  Location: [facility] ENDOSCOPY;  Service: Gastroenterology;  Laterality: N/A;  PRE: HX ANAL CANCER, HX OF POLYPS  POST: POLYPS, ANAL STRICTURE    D & C HYSTEROSCOPY  08/03/2023     Social History     Occupational History    Not on file   Tobacco Use    Smoking status: Never    Smokeless tobacco: Never   Vaping Use    Vaping status: Never Used   Substance and Sexual Activity    Alcohol use: Yes     Alcohol/week: 2.0 standard drinks of alcohol     Types: 1 Glasses of wine, 1 Shots of liquor per week    Drug use: Never    Sexual activity: Not Currently     Partners: Male          Allergies: No Known Allergies    Medications:   Home Medications:  Current Outpatient Medications on File Prior to Visit   Medication Sig    ZOLMitriptan (Zomig ZMT) 5 MG disintegrating tablet Place 1 tablet on the tongue 1 (One) Time As Needed for Migraine for up to 1 dose.     No current facility-administered medications on file prior to visit.         ROS:  ROS negative except as listed in the HPI.    Physical Exam:   69 y.o. female  Body mass index is 25.53 kg/m²., 65.4 kg (144 lb 1.6 oz)  Vitals:    12/17/24 0905   Temp: 96.9 °F (36.1 °C)     General: Alert, cooperative, appears well and in no observable distress. Appears stated age and BMI as listed above.  HEENT: Normocephalic, atraumatic on external visual inspection.  CV: No significant peripheral edema.  Respiratory: Normal respiratory effort.  Skin: Warm & well perfused; appropriate skin turgor.  Psych: Appropriate mood & affect.  Neuro: Gross sensation and motor intact in affected extremity/extremities.  Vascular: Peripheral pulses palpable in affected extremity/extremities.   Physical Exam  Knee was examined.    MSK Exam:    Knee  Exam:    Left   No deformity or wounds appreciated. No significant redness or warmth.  Trace effusion noted.  Tenderness along the joint line appreciated medially.  ROM 0-130 with pain at terminal motion.  Ligamentous exam grossly stable, pain with stressing of the MCL, valgus stress but firm endpoint.  Sj + medial mild  Bounce Home -  Quad strength 4-4+/5    Right   No deformity or wounds appreciated. No significant redness or warmth.  No significant effusion noted.  No significant tenderness appreciated about the joint.  ROM 0-130 and painless.  Ligamentous exam grossly stable.  Quad strength 4+ to 5/5.    Brief hip exam in the affected extremity(ies) grossly unremarkable.  Moves ankle and toes up and down, no significant pain or swelling in the foot, ankle or calf.        Radiology:    The following X-rays were ordered/reviewed today to evaluate the patient's symptoms: Single Knee: AP standing and sunrise views of both knees, and lateral view of painful knee show very subtle degenerative changes with reasonable joint space which is symmetric on the AP view. Reasonable patellofemoral alignment without significant degenerative change. No prior films for comparison.  Results  Imaging  X-rays of the knees show no significant arthritis and good joint space.    Procedure:   See Procedure Note: The potential risks and benefits of performing a diagnostic and therapeutic injection were discussed with the patient prior to procedure. Risks include, but are not limited to infection, swelling, transient increase in pain, bleeding, bruising. Patient was advised that injections are a diagnostic and therapeutic tool meaning they may not alleviate symptoms at all, or may only provide partial or temporary relief. Injection precautions and aftercare discussed.      [facility] [place]/Labs: N/A    Assessment & Plan:      ICD-10-CM ICD-9-CM   1. Positive Sj test of left knee, initial encounter  S83.207A 836.2   2. Chronic pain of  left knee  M25.562 719.46    G89.29 338.29     No orders of the defined types were placed in this encounter.    Orders Placed This Encounter   Procedures    Large Joint Arthrocentesis: L knee    XR Knee 3 View Left       Assessment & Plan  1. Knee pain.  The patient's knee pain is likely due to a meniscus tear, as indicated by the absence of significant arthritis on the x-ray and the presence of good joint space. The pain has been persistent for about a year, worsening over the past three months, and is exacerbated by activities such as walking. The pain sometimes wakes her up at night. She has been taking ibuprofen occasionally for pain relief. A cortisone injection will be administered today to provide relief before her trip to [place]. She is advised to take Aleve daily in the morning with breakfast during her trip and to apply ice to the knee after extensive walking. A knee brace can be used for additional support. She will continue with physical therapy sessions. If there is no improvement in her symptoms following the cortisone injection, an MRI will be ordered upon her return from [place]. If the MRI confirms a meniscus tear and conservative treatments fail to alleviate the pain, surgical intervention may be considered.    Follow-up  The patient will follow up in 6 weeks.    PROCEDURE  A cortisone injection was administered today.    Continue with activity modifications as needed/discussed.  Continue ICE and/or HEAT PRN.  Recommend to continue activity as tolerated, focus on quad and hip/core strengthening as well as gentle stretching.  Recommend lowering or maintaining BMI within a healthy range to reduce symptoms.   Patient encouraged to call with questions or concerns prior to follow up.  Will discuss with attending as needed.   Consider additional referrals, work up and/or advanced imaging as indicated or if patient fails to respond to conservative care.    Return in about 6 weeks (around 1/28/2025) for  Recheck. If symptoms change call for sooner appt..    Patient or patient representative verbalized consent for the use of Ambient Listening during the visit with  CHAZ Jones for chart documentation. 12/17/2024  12:31 EST        CHAZ Gifford    Dictation software was used to complete a portion or all of this note.    Large Joint Arthrocentesis: L knee  Date/Time: 12/17/2024 9:42 AM  Consent given by: patient  Site marked: site marked  Timeout: Immediately prior to procedure a time out was called to verify the correct patient, procedure, equipment, support staff and site/side marked as required   Supporting Documentation  Indications: pain and joint swelling   Procedure Details  Location: knee - L knee  Preparation: Patient was prepped and draped in the usual sterile fashion  Needle gauge: 21 G.  Approach: anterolateral  Medications administered: 80 mg methylPREDNISolone acetate 80 MG/ML; 2 mL lidocaine PF 1% 1 %  Patient tolerance: patient tolerated the procedure well with no immediate complications

## 2025-02-04 ENCOUNTER — OFFICE VISIT (OUTPATIENT)
Dept: ORTHOPEDIC SURGERY | Facility: CLINIC | Age: 70
End: 2025-02-04
Payer: MEDICARE

## 2025-02-04 VITALS — BODY MASS INDEX: 24.13 KG/M2 | HEIGHT: 63 IN | WEIGHT: 136.2 LBS | TEMPERATURE: 97.5 F

## 2025-02-04 DIAGNOSIS — S83.207D POSITIVE MCMURRAY TEST OF LEFT KNEE, SUBSEQUENT ENCOUNTER: Primary | ICD-10-CM

## 2025-02-04 DIAGNOSIS — G89.29 CHRONIC PAIN OF LEFT KNEE: ICD-10-CM

## 2025-02-04 DIAGNOSIS — M25.562 CHRONIC PAIN OF LEFT KNEE: ICD-10-CM

## 2025-02-04 NOTE — PROGRESS NOTES
[facility]              Follow Up      Patient Name: Randi Rios  : 1955  Primary Care Physician: Breana Sahni DO        Chief Complaint:  Left knee pain follow-up    HPI:   Randi Rios is a 69 y.o. year old who presents today for evaluation. She had an injection in December prior to a [place] trip she had planned, she says she was able to walk 15,000 steps each day with no pain. She has some evening soreness in the knee after being on her feet all day but nothing she is able to manage the discomfort with only a heating pad. She is very pleased with the results of the injection and does not feel the minimal pain she is having disrupts her daily routine at this point. She did do one session of PT prior to seeing me last and they gave her some home exercises to do which she does intermittently.  History of Present Illness        Past Medical/Surgical, Social and Family History:  I have reviewed and/or updated pertinent history as noted in the medical record including:  Past Medical History:   Diagnosis Date    Cancer     squamous cell anal cancer, tx with radiation and chemo    Colon polyps     Fracture, foot     HL (hearing loss)     Got hearing aids    Migraine     Neck strain forever    S/P breast biopsy, right 2016    BENIGN/ STEROTACTIC FOR CALCIFICATIONS    Tear of meniscus of knee      Past Surgical History:   Procedure Laterality Date    BREAST BIOPSY      COLONOSCOPY      COLONOSCOPY N/A 2022    Procedure: COLONOSCOPY INTO CECUM AND TI WITH COLD SNARE POLYPECTOMY, COLD BX POLYPECTOMY;  Surgeon: Titus Greenwood MD;  Location: [facility] ENDOSCOPY;  Service: Gastroenterology;  Laterality: N/A;  PRE: HX ANAL CANCER, HX OF POLYPS  POST: POLYPS, ANAL STRICTURE    D & C HYSTEROSCOPY  2023     Social History     Occupational History    Not on file   Tobacco Use    Smoking status: Never    Smokeless tobacco: Never   Vaping Use    Vaping status: Never  Used   Substance and Sexual Activity    Alcohol use: Yes     Alcohol/week: 2.0 standard drinks of alcohol     Types: 1 Glasses of wine, 1 Shots of liquor per week    Drug use: Never    Sexual activity: Not Currently     Partners: Male          Allergies: No Known Allergies    Medications:   Home Medications:  Current Outpatient Medications on File Prior to Visit   Medication Sig    ZOLMitriptan (Zomig ZMT) 5 MG disintegrating tablet Place 1 tablet on the tongue 1 (One) Time As Needed for Migraine for up to 1 dose.     No current facility-administered medications on file prior to visit.         ROS:  ROS negative except as listed in the HPI .    Physical Exam:   69 y.o. female  Body mass index is 24.13 kg/m²., 61.8 kg (136 lb 3.2 oz)  Vitals:    02/04/25 0856   Temp: 97.5 °F (36.4 °C)     General: Alert, cooperative, appears well and in no observable distress. Appears stated age and BMI as listed above.  HEENT: Normocephalic, atraumatic on external visual inspection.  CV: No significant peripheral edema.  Respiratory: Normal respiratory effort.  Skin: Warm & well perfused; appropriate skin turgor.  Psych: Appropriate mood & affect.  Neuro: Gross sensation and motor intact in affected extremity/extremities.  Vascular: Peripheral pulses palpable in affected extremity/extremities.   Physical Exam      MSK Exam:  Knee Exam:     Left   No deformity or wounds appreciated. No significant redness or warmth.  No ffusion noted.  Tenderness along the joint line appreciated medially.  ROM 0-130 with no pain at terminal motion.  Ligamentous exam grossly stable  Sj -  Bounce Home -  Quad strength 4-4+/5     Right   No deformity or wounds appreciated. No significant redness or warmth.  No significant effusion noted.  No significant tenderness appreciated about the joint.  ROM 0-130 and painless.  Ligamentous exam grossly stable.  Quad strength 4+ to 5/5.     Brief hip exam in the affected extremity(ies) grossly  unremarkable.  Moves ankle and toes up and down, no significant pain or swelling in the foot, ankle or calf.      Radiology:    No new images were needed at the visit today.     12/17/2024: Single Knee: AP standing and sunrise views of both knees, and lateral view of painful knee show very subtle degenerative changes with reasonable joint space which is symmetric on the AP view. Reasonable patellofemoral alignment without significant degenerative change. No prior films for comparison.   Results      Procedure:   N/A      Misc. Data/Labs: N/A    Assessment & Plan:      ICD-10-CM ICD-9-CM   1. Positive Sj test of left knee, subsequent encounter  S83.207D V58.89     836.2   2. Chronic pain of left knee  M25.562 719.46    G89.29 338.29     No orders of the defined types were placed in this encounter.    No orders of the defined types were placed in this encounter.      Assessment & Plan  Since her pain has decreased so significantly, she does not feel that she needs to escalate care on this knee at this time. I think this is reasonable especially since she has minimal pain on exam. We discussed this may still be meniscus pathology or some mild arthritis in the knee, but as long as it is not  impeding her daily activities we can manage conservatively. If her pain returns we can always consider another injection, and if that is not helpful an MRI can be ordered to guide care. I have recommended she continue to do her home exercises from PT as well to help minimize the chance of reoccurrence. She is agreeable to this plan as well. She will follow up as needed.    Continue with activity modifications as needed/discussed.  Continue ICE and/or HEAT PRN.  Recommend to continue activity as tolerated, focus on quad and hip/core strengthening as well as gentle stretching.  Recommend lowering or maintaining BMI within a healthy range to reduce symptoms.   Patient encouraged to call with questions or concerns prior to follow  up.  Will discuss with attending as needed.   Consider additional referrals, work up and/or advanced imaging as indicated or if patient fails to respond to conservative care.    Return if symptoms worsen or fail to improve.            CHAZ Gifford    Dictation software was used to complete a portion or all of this note.

## 2025-04-21 ENCOUNTER — READMISSION MANAGEMENT (OUTPATIENT)
Dept: CALL CENTER | Facility: HOSPITAL | Age: 70
End: 2025-04-21
Payer: MEDICARE

## 2025-04-22 ENCOUNTER — TRANSITIONAL CARE MANAGEMENT TELEPHONE ENCOUNTER (OUTPATIENT)
Dept: CALL CENTER | Facility: HOSPITAL | Age: 70
End: 2025-04-22
Payer: MEDICARE

## 2025-04-22 ENCOUNTER — TELEPHONE (OUTPATIENT)
Dept: FAMILY MEDICINE CLINIC | Facility: CLINIC | Age: 70
End: 2025-04-22

## 2025-04-22 NOTE — TELEPHONE ENCOUNTER
"  Caller: Randi Rios \"Annalisa\"    Relationship to patient: Self    Best call back number: [phone]     New or established patient?  [] New  [x] Established    Date of discharge: 4/21/25    Facility discharged from: [facility]    Diagnosis/Symptoms: URINARY RETENTION    Length of stay (If applicable): 4/20/25-4/21/25    Specialty Only: Did you see a Religion health provider?    [] Yes  [x] No  If so, who?     Additional Details:     "

## 2025-04-22 NOTE — OUTREACH NOTE
Call Center TCM Note      Flowsheet Row Responses   [facility] facility patient discharged from? Non-  [[place]]   Does the patient have one of the following disease processes/diagnoses(primary or secondary)? Other   TCM attempt successful? No   Unsuccessful attempts Attempt 2            TERESA WATT - Registered Nurse    4/22/2025, 11:37 EDT

## 2025-04-22 NOTE — OUTREACH NOTE
Prep Survey      Flowsheet Row Responses   [facility] facility patient discharged from? Non-BH   Is LACE score < 7 ? Non-BH Discharge   Eligibility [facility]   Date of Admission 04/20/25   Date of Discharge 04/21/25   Discharge Disposition Home or Self Care   Discharge diagnosis PILLO (acute kidney injury)   Does the patient have one of the following disease processes/diagnoses(primary or secondary)? Other   Does the patient have Home health ordered? No   Prep survey completed? Yes            Vidya JIANG - Registered Nurse

## 2025-04-22 NOTE — OUTREACH NOTE
Call Center TCM Note      Flowsheet Row Responses   [facility] patient discharged from? Non-BH  [[facility]]   Does the patient have one of the following disease processes/diagnoses(primary or secondary)? Other   TCM attempt successful? No  [VR- daughter]   Unsuccessful attempts Attempt 1  [Daughter states to call her mom back later. She has no info. States a different daughter is staying with her.]            TERESA WATT - Registered Nurse    4/22/2025, 08:32 EDT

## 2025-04-23 ENCOUNTER — OFFICE VISIT (OUTPATIENT)
Dept: FAMILY MEDICINE CLINIC | Facility: CLINIC | Age: 70
End: 2025-04-23
Payer: MEDICARE

## 2025-04-23 ENCOUNTER — TRANSITIONAL CARE MANAGEMENT TELEPHONE ENCOUNTER (OUTPATIENT)
Dept: CALL CENTER | Facility: HOSPITAL | Age: 70
End: 2025-04-23
Payer: MEDICARE

## 2025-04-23 VITALS
SYSTOLIC BLOOD PRESSURE: 140 MMHG | BODY MASS INDEX: 23.04 KG/M2 | HEIGHT: 63 IN | DIASTOLIC BLOOD PRESSURE: 64 MMHG | HEART RATE: 82 BPM | WEIGHT: 130 LBS | OXYGEN SATURATION: 99 %

## 2025-04-23 DIAGNOSIS — N13.9 LOWER OBSTRUCTIVE UROPATHY: Primary | ICD-10-CM

## 2025-04-23 DIAGNOSIS — N17.9 ACUTE RENAL FAILURE, UNSPECIFIED ACUTE RENAL FAILURE TYPE: ICD-10-CM

## 2025-04-23 RX ORDER — CHOLECALCIFEROL (VITAMIN D3) 25 MCG
1000 TABLET ORAL DAILY
COMMUNITY

## 2025-04-23 NOTE — PROGRESS NOTES
"Chief Complaint  Hospital Follow Up Visit ([facility])    Subjective        Randi Rios presents to [facility] PRIMARY CARE  History of Present Illness  Patient is here today to follow-up on her recent short stay in the hospital.  She was admitted to [facility] on 4/20/2025 and discharged on 4/21/2025.  She went to the ER the day of admission because she was having lower abdominal pressure, back pain, nausea and vomiting.  She states that her symptoms started several months ago with frequency of urination.  Over the past few weeks the frequency of urination has gotten significantly worse.  She then started having some lower abdominal pressure and then eventually nausea and vomiting.  The patient started getting relief of the lower abdominal pressure, neck pain after having a Gold catheter inserted.  She states that the Gold catheter drained a significant amount of urine immediately after it was placed.  The doctors felt that the acute renal failure seen on her lab work was likely from an obstructive uropathy that was effectively treated with the Gold catheter.  Her creatinine was trending down on the day of discharge so the patient was felt to be able to be discharged home with the indwelling Gold catheter to be removed in 1 week by urology.  She has an appointment on April 29, 2025 with urology, Dr. Christian.  Patient states that she is feeling well and she no longer has any symptoms.      Objective   Vital Signs:  /64   Pulse 82   Ht 160 cm (63\")   Wt 59 kg (130 lb)   SpO2 99%   BMI 23.03 kg/m²   Estimated body mass index is 23.03 kg/m² as calculated from the following:    Height as of this encounter: 160 cm (63\").    Weight as of this encounter: 59 kg (130 lb).    BMI is within normal parameters. No other follow-up for BMI required.      Physical Exam  Vitals and nursing note reviewed.   Constitutional:       Appearance: She is well-developed.   HENT:    "   Head: Normocephalic and atraumatic.   Eyes:      Conjunctiva/sclera: Conjunctivae normal.   Cardiovascular:      Rate and Rhythm: Normal rate and regular rhythm.      Heart sounds: Normal heart sounds.   Pulmonary:      Effort: Pulmonary effort is normal.      Breath sounds: Normal breath sounds.   Abdominal:      General: Bowel sounds are normal.      Palpations: Abdomen is soft.   Musculoskeletal:         General: Normal range of motion.      Cervical back: Normal range of motion and neck supple.   Skin:     General: Skin is warm and dry.      Capillary Refill: Capillary refill takes less than 2 seconds.      Findings: No rash.   Neurological:      Mental Status: She is alert and oriented to person, place, and time.   Psychiatric:         Mood and Affect: Mood normal.         Behavior: Behavior normal.         Thought Content: Thought content normal.         Judgment: Judgment normal.        Result Review :  The following data was reviewed by: Breana Sahni DO on 04/23/2025:  Common labs          8/15/2024    11:48   Common Labs   Glucose 90    BUN 17    Creatinine 0.74    Sodium 140    Potassium 4.5    Chloride 102    Calcium 9.5    Albumin 3.9    Total Bilirubin 0.3    Alkaline Phosphatase 62    AST (SGOT) 25    ALT (SGPT) 17    Total Cholesterol 175    Triglycerides 54    HDL Cholesterol 63    LDL Cholesterol  101    COMPREHENSIVE METABOLIC PANEL (04/20/2025 21:32)  LIPASE (04/20/2025 21:32)  MAGNESIUM (04/20/2025 21:32)  PHOSPHORUS (04/20/2025 21:32)  CBC AND DIFFERENTIAL (04/20/2025 21:32)  BASIC METABOLIC PANEL (04/21/2025 05:30)  CBC AND DIFFERENTIAL (04/21/2025 05:30)  PROTIME-INR (04/21/2025 05:30)  APTT (04/21/2025 05:30)      Data reviewed : Recent hospitalization notes see below    [facility]     DISCHARGE SUMMARY    PATIENT NAME: Randi Rios  YOB: 1955  MRN: [id number]    DATE OF SERVICE: 4/21/2025    ADMISSION DATE:  2025    DISCHARGE DATE: 2025    ATTENDING PHYSICIAN: Ugo Alvarado MD     DISCHARGE DIAGNOSES:   Principal Problem:  PILLO (acute kidney injury) (POA: Yes)  Date Noted: 2025  Active Problems:  Hydronephrosis (POA: Yes)  Date Noted: 2025  Hepatic cyst (POA: Yes)  Date Noted: 2025  Obstructive uropathy (POA: Yes)  Date Noted: 2025  Hyperphosphatemia (POA: Yes)  Date Noted: 2025    HOSPITAL COURSE:   Ms. Rios is a 69-year-old lady who presented to the hospital due to abdominal pain/pressure and noted to have urinary retention requiring passage of a Gold catheter. Her renal function has improved during this short hospital stay, seen by Dr. Moore of the urology service, who recommended keeping the Gold catheter and follow-up in the office in 1 week. A renal ultrasound was ordered however it has yet to be done, and she would prefer to go home. Discussed with her that her renal function likely will continue to improve. Her vital signs are stable and discharged in good condition    CONSULTATIONS:   As above.    DIAGNOSTICS & PROCEDURES:    CT Abdomen & Pelvis Without IV Contrast Without Oral Contrast    Result Date: 2025  REVIEWING YOUR TEST RESULTS IN [facility] IS NOT A SUBSTITUTE FOR DISCUSSING THOSE RESULTS WITH YOUR HEALTH CARE PROVIDER. PLEASE CONTACT YOUR PROVIDER VIA [facility] TO DISCUSS ANY QUESTIONS OR CONCERNS YOU MAY HAVE REGARDING THESE TEST RESULTS. RADIOLOGY REPORT FACILITY: [facility] \[facility]\"" UNIT/AGE/GENDER: M.ED ER AGE:69 Y SEX:F PATIENT NAME/: AUGIE RIOS JANE 1955 UNIT NUMBER: [id number] ACCOUNT NUMBER: [id number] ACCESSION NUMBER: [id number] ACCESSION NUMBER: [id number] DATE: 2025 11:04 PM EXAMINATION: CT ABDOMEN AND PELVIS WO IV CONTRAST PROVIDED INDICATION: UTI, recurrent/complicated (Female) COMPARISON: None TECHNIQUE: Axial computed tomographic images of the abdomen and pelvis without intravenous  contrast. Oral contrast was not administered. Reformatted images include sagittal, and coronal. CT scans at this facility use dose modulation, iterative reconstruction and/or weight based dosing when appropriate to reduce radiation dose to as low as reasonably achievable. FINDINGS: Examination limited by lack of intravenous contrast. Lower chest: Partially imaged lung bases are clear. Heart size is normal. No pericardial or pleural effusion. Liver: Multiple hypodensities are present within the liver, with intrinsic fluid signal, the largest measuring up to 2.6 cm, compatible with simple hepatic cysts. Biliary: The gallbladder and bile ducts are normal. Pancreas: The pancreas is unremarkable. No main duct dilatation. Spleen: The spleen is normal in size. Adrenal glands: No adrenal mass. Kidneys and ureters: Moderate right and mild to moderate left hydroureteronephrosis. No evidence of an obstructing calculus. Urinary bladder: Urinary bladder is markedly distended. No bladder wall thickening. Reproductive: Unremarkable. Gastrointestinal: No evidence of small bowel obstruction or focal bowel wall thickening. Lymph nodes: No lymphadenopathy. Vessels: No significant vascular pathology. Peritoneum: No free intraperitoneal air. Brianna mesenteric appearance, particularly along the paracolic gutters. Body wall: Mild generalized body wall edema. Bones: No acute or aggressive osseous lesion. IMPRESSION: 1.Marked distention of the urinary bladder with moderate right and mild to moderate left hydroureteronephrosis. No evidence of an obstructing calculus. 2.Brianna appearance of the mesentery, with generalized body wall edema. Findings likely related to third spacing and overhydration. 3.Multiple hepatic cysts, the largest measuring up to 2.6 cm. Dictated by: Clark Mares M.D. Images and Report reviewed and interpreted by: Clark Mares M.D. <PS><Electronically signed by: Clark Mares M.D.> 04/20/2025 [id number] D: 04/20/2025 3264 T:  04/20/2025 2307       Current Discharge Medication List       START taking these medications   Details   calcium polycarbophil (FIBERCON) 625 MG tablet Take 1 tablet by mouth daily.  Qty: 90 tablet, Refills: 0     chlorhexidine (PERIDEX) 0.12% solution Use 15 mLs in the mouth or throat 2 (two) times daily.  Qty: 473 mL, Refills: 3     HYDROcodone-acetaminophen (NORCO) 5-325 MG per tablet Take 1 tablet by mouth every 6 (six) hours as needed for Pain. Max Daily Amount: 4 tablets  Qty: 20 tablet, Refills: 0     ondansetron (ZOFRAN) 4 MG tablet Take 1 tablet by mouth every 6 (six) hours as needed for Nausea.  Qty: 20 tablet, Refills: 1         CONTINUE these medications which have NOT CHANGED   Details   ciprofloxacin (CIPRO) 500 MG tablet Take 1 tablet by mouth 2 (two) times daily for 7 days.  Qty: 14 tablet, Refills: 0   Associated Diagnoses: Complicated UTI (urinary tract infection)     ondansetron (ZOFRAN-ODT) 4 MG disintegrating tablet Take 1 tablet by mouth every 8 (eight) hours as needed for Nausea for up to 7 days.  Qty: 21 tablet, Refills: 0   Associated Diagnoses: Complicated UTI (urinary tract infection)         STOP taking these medications     ZOLMitriptan (ZOMIG-ZMT) 5 MG disintegrating tablet         [facility]varado MD [facility]     Transitional Care Management Telephone Encounter with Anamaria Dailey LPN (04/23/2025)              Assessment and Plan   Diagnoses and all orders for this visit:    1. Lower obstructive uropathy (Primary)  -     Cancel: Ambulatory Referral to Urology  -     Basic Metabolic Panel    2. Acute renal failure, unspecified acute renal failure type  -     Basic Metabolic Panel    Patient is here today to follow-up from her recent hospital admission at [facility] for acute renal failure due to obstructive uropathy.  Patient currently has an indwelling Gold catheter that is draining urine effectively.  She has no symptoms.  I reviewed her CT scan and lab and the discharge summary from  [facility].  Her initial creatinine was 2.47 but had dropped to 2.14 before discharge.  I will recheck a BMP today and have encouraged the patient to continue drinking plenty of water every day.  She should avoid caffeine and alcohol until seen by the urologist next week.  She has a scheduled follow-up with urology in 1 week.  Patient advised to keep that appointment but follow-up with me sooner if she has any fevers, chills, nausea, abdominal pain, or if her Gold catheter stops draining urine.           Follow Up   Return if symptoms worsen or fail to improve.  Patient was given instructions and counseling regarding her condition or for health maintenance advice. Please see specific information pulled into the AVS if appropriate.

## 2025-04-23 NOTE — OUTREACH NOTE
Call Center TCM Note      Flowsheet Row Responses   [facility] patient discharged from? Non-  [[place]]   Does the patient have one of the following disease processes/diagnoses(primary or secondary)? Other   TCM attempt successful? Yes   Discharge diagnosis PILLO (acute kidney injury)   TCM call completed? Yes   Wrap up additional comments Pt currently at PCP appt.  TCM completed            Anamaria ETIENNE - Licensed Nurse    4/23/2025, 14:04 EDT

## 2025-04-24 LAB
BUN SERPL-MCNC: 20 MG/DL (ref 8–27)
BUN/CREAT SERPL: 17 (ref 12–28)
CALCIUM SERPL-MCNC: 9.1 MG/DL (ref 8.7–10.3)
CHLORIDE SERPL-SCNC: 98 MMOL/L (ref 96–106)
CO2 SERPL-SCNC: 27 MMOL/L (ref 20–29)
CREAT SERPL-MCNC: 1.18 MG/DL (ref 0.57–1)
EGFRCR SERPLBLD CKD-EPI 2021: 50 ML/MIN/1.73
GLUCOSE SERPL-MCNC: 103 MG/DL (ref 70–99)
POTASSIUM SERPL-SCNC: 3.5 MMOL/L (ref 3.5–5.2)
SODIUM SERPL-SCNC: 141 MMOL/L (ref 134–144)

## 2025-05-23 ENCOUNTER — PATIENT MESSAGE (OUTPATIENT)
Dept: FAMILY MEDICINE CLINIC | Facility: CLINIC | Age: 70
End: 2025-05-23
Payer: MEDICARE

## 2025-05-29 ENCOUNTER — OFFICE VISIT (OUTPATIENT)
Dept: FAMILY MEDICINE CLINIC | Facility: CLINIC | Age: 70
End: 2025-05-29
Payer: MEDICARE

## 2025-05-29 VITALS
BODY MASS INDEX: 21.9 KG/M2 | HEART RATE: 70 BPM | OXYGEN SATURATION: 100 % | HEIGHT: 63 IN | DIASTOLIC BLOOD PRESSURE: 72 MMHG | SYSTOLIC BLOOD PRESSURE: 133 MMHG | WEIGHT: 123.6 LBS

## 2025-05-29 DIAGNOSIS — K59.00 CONSTIPATION, UNSPECIFIED CONSTIPATION TYPE: ICD-10-CM

## 2025-05-29 DIAGNOSIS — K63.5 HYPERPLASTIC COLONIC POLYP, UNSPECIFIED PART OF COLON: ICD-10-CM

## 2025-05-29 DIAGNOSIS — R33.9 URINARY RETENTION: Primary | ICD-10-CM

## 2025-05-29 DIAGNOSIS — D12.6 ADENOMATOUS POLYP OF COLON, UNSPECIFIED PART OF COLON: ICD-10-CM

## 2025-05-29 DIAGNOSIS — Z85.048 HISTORY OF ANAL CANCER: ICD-10-CM

## 2025-05-29 DIAGNOSIS — K62.4 ANAL STRICTURE: ICD-10-CM

## 2025-05-29 PROCEDURE — 99213 OFFICE O/P EST LOW 20 MIN: CPT | Performed by: FAMILY MEDICINE

## 2025-05-29 PROCEDURE — 1159F MED LIST DOCD IN RCRD: CPT | Performed by: FAMILY MEDICINE

## 2025-05-29 PROCEDURE — 1126F AMNT PAIN NOTED NONE PRSNT: CPT | Performed by: FAMILY MEDICINE

## 2025-05-29 PROCEDURE — 1160F RVW MEDS BY RX/DR IN RCRD: CPT | Performed by: FAMILY MEDICINE

## 2025-05-29 NOTE — PROGRESS NOTES
"Chief Complaint  follow up on [employer]     Subjective        Randi Rios presents to [facility] PRIMARY CARE  History of Present Illness  Patient is here today to follow-up on recent changes in her health.  She was hospitalized for acute renal failure secondary to urinary retention.  She still has an indwelling catheter.  The first Gold catheter was removed in the urologist office but then had to be put back in the following day because the patient was unable to urinate.  She was trying to self cath but unfortunately caused a urinary tract infection that was treated with antibiotics.  She just completed the antibiotics.  Patient also had an episode of vaginal bleeding on May 17, 2025 and again later that week.  She has had vaginal bleeding in the past year which was evaluated by her gynecologist.  The gynecologist felt that it was due to scar tissue from prior radiation for anal cancer.  Patient has also had some intermittent constipation worse than normal over the last couple of months.  Her last colonoscopy was in 2022 and it was recommended that she have her next colonoscopy with Dr. Nona Broussard in about 3 years due to hyperplastic polyps.Patient has not had any black stools or bloody stools.  Patient has had approximately a 20 pound weight loss since December 2024.  Patient admits she has not been eating as much is normal with everything going on.    urinary retention, constipation, vaginal bleeding  Symptoms include: change in stool.   Pertinent negative symptoms include no abdominal pain, no anorexia, no joint pain, no chest pain, no chills, no congestion, no cough, no diaphoresis, no fatigue, no fever, no headaches, no joint swelling, no myalgias, no nausea, no neck pain, no numbness, no rash, no sore throat, no swollen glands, no dysuria, no vertigo, no visual change, no vomiting and no weakness.       Objective   Vital Signs:  /72   Pulse 70   Ht 160 cm (63\")   Wt " "56.1 kg (123 lb 9.6 oz)   SpO2 100%   BMI 21.89 kg/m²   Estimated body mass index is 21.89 kg/m² as calculated from the following:    Height as of this encounter: 160 cm (63\").    Weight as of this encounter: 56.1 kg (123 lb 9.6 oz).    BMI is within normal parameters. No other follow-up for BMI required.      Physical Exam  Vitals and nursing note reviewed.   Constitutional:       Appearance: She is well-developed.   HENT:      Head: Normocephalic and atraumatic.   Eyes:      Conjunctiva/sclera: Conjunctivae normal.   Cardiovascular:      Rate and Rhythm: Normal rate and regular rhythm.      Heart sounds: Normal heart sounds.   Pulmonary:      Effort: Pulmonary effort is normal.      Breath sounds: Normal breath sounds.   Abdominal:      General: Bowel sounds are normal. There is no distension.      Palpations: Abdomen is soft. There is no mass.      Tenderness: There is no abdominal tenderness. There is no right CVA tenderness, left CVA tenderness, guarding or rebound.      Hernia: No hernia is present.   Musculoskeletal:         General: Normal range of motion.      Cervical back: Normal range of motion and neck supple.   Skin:     General: Skin is warm and dry.      Capillary Refill: Capillary refill takes less than 2 seconds.      Findings: No rash.   Neurological:      Mental Status: She is alert and oriented to person, place, and time.   Psychiatric:         Mood and Affect: Mood normal.         Behavior: Behavior normal.         Thought Content: Thought content normal.         Judgment: Judgment normal.        Result Review :  The following data was reviewed by: Breana Sahni DO on 05/29/2025:  Common labs          8/15/2024    11:48 4/23/2025    14:21   Common Labs   Glucose 90  103    BUN 17  20    Creatinine 0.74  1.18    Sodium 140  141    Potassium 4.5  3.5    Chloride 102  98    Calcium 9.5  9.1    Albumin 3.9     Total Bilirubin 0.3     Alkaline Phosphatase 62     AST (SGOT) 25     ALT (SGPT) 17  "    Total Cholesterol 175     Triglycerides 54     HDL Cholesterol 63     LDL Cholesterol  101                   Assessment and Plan   Diagnoses and all orders for this visit:    1. Urinary retention (Primary)    2. History of anal cancer  -     Ambulatory Referral to Colorectal Surgery    3. Anal stricture  -     Ambulatory Referral to Colorectal Surgery    4. Constipation, unspecified constipation type  -     Ambulatory Referral to Colorectal Surgery    5. Hyperplastic colonic polyp, unspecified part of colon  -     Ambulatory Referral to Colorectal Surgery    6. Adenomatous polyp of colon, unspecified part of colon  -     Ambulatory Referral to Colorectal Surgery    Patient is here today for follow-up of recent health changes.  Advised the patient keep planned f/u with Dr. Murrell urogynecology for workup regarding urinary retention.  Patient has an appointment next week with GYN for workup of postmenopausal bleeding, since she has had issues with it in the past.  I discussed the importance of keeping this appointment in lieu of the recent weight loss I am more concerned about ruling out the possibility of a gynecologic malignancy.  No pelvic masses were appreciated on CT from April 2025.  Patient agrees and will keep appointment next week.  Patient is due for colon cancer screening due to hyperplastic polyps on last colonoscopy 3 years ago.  Referral to colorectal surgery entered.           Follow Up   Return if symptoms worsen or fail to improve.  Patient was given instructions and counseling regarding her condition or for health maintenance advice. Please see specific information pulled into the AVS if appropriate.

## 2025-06-05 ENCOUNTER — OFFICE VISIT (OUTPATIENT)
Dept: SURGERY | Facility: CLINIC | Age: 70
End: 2025-06-05
Payer: MEDICARE

## 2025-06-05 VITALS
HEIGHT: 62 IN | HEART RATE: 79 BPM | DIASTOLIC BLOOD PRESSURE: 60 MMHG | BODY MASS INDEX: 24.35 KG/M2 | SYSTOLIC BLOOD PRESSURE: 120 MMHG | OXYGEN SATURATION: 100 % | WEIGHT: 132.3 LBS

## 2025-06-05 DIAGNOSIS — Z80.0 FAMILY HISTORY OF COLON CANCER: ICD-10-CM

## 2025-06-05 DIAGNOSIS — Z86.0101 PERSONAL HISTORY OF ADENOMATOUS AND SERRATED COLON POLYPS: Primary | ICD-10-CM

## 2025-06-05 DIAGNOSIS — Z85.048 HISTORY OF ANAL CANCER: ICD-10-CM

## 2025-06-05 DIAGNOSIS — K62.4 ANAL STRICTURE: ICD-10-CM

## 2025-06-05 RX ORDER — SODIUM CHLORIDE, SODIUM LACTATE, POTASSIUM CHLORIDE, CALCIUM CHLORIDE 600; 310; 30; 20 MG/100ML; MG/100ML; MG/100ML; MG/100ML
30 INJECTION, SOLUTION INTRAVENOUS CONTINUOUS
OUTPATIENT
Start: 2025-06-05 | End: 2025-06-06

## 2025-06-05 NOTE — PROGRESS NOTES
History of Present Illness  The patient is a 69-year-old female presenting as a new patient for evaluation of constipation with a personal history of anal cancer and colon polyps, and family history of colon cancer.    She experienced urinary retention and kidney issues in 04/2025, coinciding with the onset of severe constipation and vaginal bleeding. Her primary care physician recommended a consultation here, and it was discovered that she had been referred for her next colonoscopy, recommended to be done this summer. The constipation has since resolved with stool softener use, and she does not perceive it as the most significant issue of the problems that she is currently experiencing. She discontinued the use of stool softeners two days ago, having used them for several weeks.     She reports no rectal bleeding but notes vaginal bleeding, which she plans to address later today with her OB/GYN. She has had intermittent bouts of constipation for many years. She occasionally experiences constipation and difficulty passing stool but does not recall any instances of bleeding unless there is a tear. She has a skin tag in the anal area and describes her anal canal as constricted, a condition that has remained unchanged for years.     She also has a urinary catheter in place and expresses a desire to have it removed prior to undergoing bowel prep. She recalls an unpleasant experience during her last colonoscopy prep, where she had to change her pants twice due to incontinence. She was advised to either skip the second dose or take it earlier during her next prep. She also remembers vomiting after taking prep pills on one occasion. She has a history of anal cancer diagnosed in 2002, which was associated with pain and difficulty passing stool. She underwent chemotherapy and radiation therapy for the anal cancer at [facility]. She has been using stool softeners intermittently for decades.    Her last  colonoscopy, performed three years ago, revealed the presence of polyps, a recurrent finding in her case. I reviewed the colonoscopy by Dr. Titus Greenwood performed on 7/29/2022 which was notable for anal stricture and 2 rectal polyps removed that were pathologically consistent with tubular adenoma and hyperplastic polyp.  Dr. Greenwood did recommend follow-up with colorectal surgery at that time due to the anal stricture and her history of anal cancer.    FAMILY HISTORY  Her sister has had colon cancer multiple times.       Past Medical History:   Diagnosis Date    Allergic 04/01/1966    hay fever, etc.    Cancer 2002    squamous cell anal cancer, tx with radiation and chemo    Chronic kidney disease 04-20-25    Hydronephrosis; accute kidney injury    Colon polyps     Fissure, anal 2002    Fracture, foot 2-2023    HL (hearing loss) 2020    Got hearing aids    Migraine     Neck strain forever    Osteopenia     Rectal bleeding 2002    S/P breast biopsy, right 01/06/2016    BENIGN/ STEROTACTIC FOR CALCIFICATIONS    Tear of meniscus of knee     Urinary tract infection        Past Surgical History:   Procedure Laterality Date    BREAST BIOPSY      COLONOSCOPY      COLONOSCOPY N/A 07/29/2022    Procedure: COLONOSCOPY INTO CECUM AND TI WITH COLD SNARE POLYPECTOMY, COLD BX POLYPECTOMY;  Surgeon: Titus Greenwood MD;  Location: [facility] ENDOSCOPY;  Service: Gastroenterology;  Laterality: N/A;  PRE: HX ANAL CANCER, HX OF POLYPS  POST: POLYPS, ANAL STRICTURE    D & C HYSTEROSCOPY  08/03/2023       Social History:   reports that she has never smoked. She has never been exposed to tobacco smoke. She has never used smokeless tobacco. She reports current alcohol use of about 1.0 standard drink of alcohol per week. She reports that she does not use drugs.      Marriage status:     Family History   Problem Relation Age of Onset    Heart failure Mother     Heart disease Mother     Heart failure Father     Arthritis Father      Heart disease Father     Hyperlipidemia Father     Hypertension Father     Cancer Sister         colon    Arthritis Sister     Asthma Sister     Heart disease Sister     Vision loss Sister     Broken bones Sister         back & leg (diff times)    Osteoporosis Sister         just diagnosed    No Known Problems Brother     No Known Problems Son     No Known Problems Daughter     No Known Problems Maternal Grandmother     No Known Problems Maternal Grandfather     No Known Problems Paternal Grandmother     No Known Problems Paternal Grandfather     No Known Problems Cousin     Cancer Sister         breast    Cancer Brother         Throat and lung    Heart disease Brother     Hyperlipidemia Brother          Current Outpatient Medications:     Sennosides-Docusate Sodium (STOOL SOFTENER & LAXATIVE PO), Take  by mouth., Disp: , Rfl:     Cholecalciferol 25 MCG (1000 UT) tablet, Take 1 tablet by mouth Daily., Disp: , Rfl:     Cream Base (EMOLLIENT BASE EX), Take  by mouth. COLLAGEN, Disp: , Rfl:     vitamin k 100 MCG tablet, Take 1 tablet by mouth Daily., Disp: , Rfl:     ZOLMitriptan (Zomig ZMT) 5 MG disintegrating tablet, Place 1 tablet on the tongue 1 (One) Time As Needed for Migraine for up to 1 dose., Disp: 9 tablet, Rfl: 0    Allergy  Patient has no known allergies.    Vitals:    06/05/25 1126   BP: 120/60   Pulse: 79   SpO2: 100%   -  Body mass index is 24.2 kg/m².    Physical Exam  No acute distress  Chaperone present  Perianal exam: radiation changes of hyperpigmentation of blood vessels of perianal skin. Benign-appearing 1 cm x 0.5 cm x 0.5 cm skin tag in left lateral position.  PORFIRIO: Anal opening is strictured, not large enough for the insertion of my index finger.     Assessment & Plan  1. Constipation.  She experienced constipation for a couple of weeks but has since stopped using stool softeners two days ago, as the constipation has resolved. No rectal bleeding was reported. She has a history of anal cancer  treated with chemotherapy and radiation, which may contribute to her symptoms. A colonoscopy will be scheduled in approximately three months. She prefers to have her catheter removed before the bowel prep. The morning dose of the prep can be taken two hours earlier to avoid accidents in the preprocedure areas.    2. History of anal cancer.  She had anal cancer in 2002, treated with chemotherapy and radiation. She occasionally experiences constipation and difficulty passing stool but no current rectal bleeding. An examination of the anal area was performed, revealing radiation changes but no acutely concerning abnormalities.  Her symptoms of largely been unchanged for many years.  A colonoscopy will be scheduled to monitor for any recurrence or new issues.    3. Colon polyps, family history of colon cancer.  She has a history of colon polyps and had a colonoscopy three years ago. A follow-up colonoscopy is due this summer. The procedure will be scheduled in approximately three months.    PROCEDURE  Colonoscopy three years ago revealed polyps.       Patient or patient representative verbalized consent for the use of Ambient Listening during the visit with  Melissa Powers PA-C for chart documentation. 6/5/2025  11:53 EDT    Melissa Powers PA-C  Physician Assistant  Colorectal Surgery

## 2025-08-14 ENCOUNTER — PATIENT MESSAGE (OUTPATIENT)
Dept: FAMILY MEDICINE CLINIC | Facility: CLINIC | Age: 70
End: 2025-08-14
Payer: MEDICARE

## 2025-08-14 DIAGNOSIS — Z80.0 FAMILY HISTORY OF LYNCH SYNDROME: Primary | ICD-10-CM

## 2025-08-18 ENCOUNTER — OFFICE VISIT (OUTPATIENT)
Dept: FAMILY MEDICINE CLINIC | Facility: CLINIC | Age: 70
End: 2025-08-18
Payer: MEDICARE

## 2025-08-18 VITALS
BODY MASS INDEX: 23.85 KG/M2 | HEIGHT: 62 IN | DIASTOLIC BLOOD PRESSURE: 82 MMHG | SYSTOLIC BLOOD PRESSURE: 148 MMHG | HEART RATE: 70 BPM | OXYGEN SATURATION: 99 % | WEIGHT: 129.6 LBS

## 2025-08-18 DIAGNOSIS — R79.89 ELEVATED SERUM CREATININE: ICD-10-CM

## 2025-08-18 DIAGNOSIS — G43.109 MIGRAINE WITH AURA AND WITHOUT STATUS MIGRAINOSUS, NOT INTRACTABLE: ICD-10-CM

## 2025-08-18 DIAGNOSIS — Z00.00 MEDICARE ANNUAL WELLNESS VISIT, SUBSEQUENT: Primary | ICD-10-CM

## 2025-08-18 PROCEDURE — G0439 PPPS, SUBSEQ VISIT: HCPCS | Performed by: FAMILY MEDICINE

## 2025-08-18 PROCEDURE — 1159F MED LIST DOCD IN RCRD: CPT | Performed by: FAMILY MEDICINE

## 2025-08-18 PROCEDURE — 1126F AMNT PAIN NOTED NONE PRSNT: CPT | Performed by: FAMILY MEDICINE

## 2025-08-18 PROCEDURE — 1160F RVW MEDS BY RX/DR IN RCRD: CPT | Performed by: FAMILY MEDICINE

## 2025-08-18 PROCEDURE — 99213 OFFICE O/P EST LOW 20 MIN: CPT | Performed by: FAMILY MEDICINE

## 2025-08-19 LAB
BUN SERPL-MCNC: 25 MG/DL (ref 8–23)
BUN/CREAT SERPL: 29.8 (ref 7–25)
CALCIUM SERPL-MCNC: 9.9 MG/DL (ref 8.6–10.5)
CHLORIDE SERPL-SCNC: 101 MMOL/L (ref 98–107)
CO2 SERPL-SCNC: 27 MMOL/L (ref 22–29)
CREAT SERPL-MCNC: 0.84 MG/DL (ref 0.57–1)
EGFRCR SERPLBLD CKD-EPI 2021: 74.9 ML/MIN/1.73
GLUCOSE SERPL-MCNC: 83 MG/DL (ref 65–99)
POTASSIUM SERPL-SCNC: 4.9 MMOL/L (ref 3.5–5.2)
SODIUM SERPL-SCNC: 139 MMOL/L (ref 136–145)

## 2025-08-22 ENCOUNTER — TELEPHONE (OUTPATIENT)
Dept: GENETICS | Facility: HOSPITAL | Age: 70
End: 2025-08-22
Payer: MEDICARE

## 2025-08-25 ENCOUNTER — CLINICAL SUPPORT (OUTPATIENT)
Dept: GENETICS | Facility: HOSPITAL | Age: 70
End: 2025-08-25
Payer: MEDICARE

## 2025-08-25 DIAGNOSIS — Z85.048 HISTORY OF ANAL CANCER: ICD-10-CM

## 2025-08-25 DIAGNOSIS — Z80.41 FAMILY HISTORY OF OVARIAN CANCER: ICD-10-CM

## 2025-08-25 DIAGNOSIS — Z80.0 FAMILY HISTORY OF COLON CANCER: ICD-10-CM

## 2025-08-25 DIAGNOSIS — Z80.1 FAMILY HISTORY OF LUNG CANCER: ICD-10-CM

## 2025-08-25 DIAGNOSIS — Z13.79 GENETIC TESTING: Primary | ICD-10-CM

## 2025-08-25 DIAGNOSIS — Z80.3 FAMILY HISTORY OF BREAST CANCER: ICD-10-CM

## 2025-08-28 ENCOUNTER — TELEPHONE (OUTPATIENT)
Dept: SURGERY | Facility: CLINIC | Age: 70
End: 2025-08-28
Payer: MEDICARE

## (undated) DEVICE — SINGLE-USE BIOPSY FORCEPS: Brand: RADIAL JAW 4

## (undated) DEVICE — ADAPT CLN BIOGUARD AIR/H2O DISP

## (undated) DEVICE — SENSR O2 OXIMAX FNGR A/ 18IN NONSTR

## (undated) DEVICE — THE SINGLE USE ETRAP – POLYP TRAP IS USED FOR SUCTION RETRIEVAL OF ENDOSCOPICALLY REMOVED POLYPS.: Brand: ETRAP

## (undated) DEVICE — CANN O2 ETCO2 FITS ALL CONN CO2 SMPL A/ 7IN DISP LF

## (undated) DEVICE — TUBING, SUCTION, 1/4" X 10', STRAIGHT: Brand: MEDLINE

## (undated) DEVICE — SNAR POLYP CAPTIVATOR RND STFF 2.4 240CM 10MM 1P/U

## (undated) DEVICE — KT ORCA ORCAPOD DISP STRL

## (undated) DEVICE — LN SMPL CO2 SHTRM SD STREAM W/M LUER